# Patient Record
Sex: FEMALE | Race: WHITE | Employment: PART TIME | URBAN - METROPOLITAN AREA
[De-identification: names, ages, dates, MRNs, and addresses within clinical notes are randomized per-mention and may not be internally consistent; named-entity substitution may affect disease eponyms.]

---

## 2020-09-30 ENCOUNTER — OFFICE VISIT (OUTPATIENT)
Dept: FAMILY MEDICINE CLINIC | Age: 20
End: 2020-09-30
Payer: MEDICARE

## 2020-09-30 VITALS
TEMPERATURE: 98 F | BODY MASS INDEX: 39.53 KG/M2 | OXYGEN SATURATION: 98 % | DIASTOLIC BLOOD PRESSURE: 86 MMHG | SYSTOLIC BLOOD PRESSURE: 124 MMHG | HEART RATE: 105 BPM | HEIGHT: 66 IN | WEIGHT: 246 LBS

## 2020-09-30 PROBLEM — K64.9 HEMORRHOIDS: Status: ACTIVE | Noted: 2020-09-30

## 2020-09-30 PROBLEM — Z78.9 ELECTRONIC CIGARETTE USE: Status: ACTIVE | Noted: 2020-09-30

## 2020-09-30 LAB
HAV IGM SER IA-ACNC: NORMAL
HEPATITIS B CORE IGM ANTIBODY: NORMAL
HEPATITIS B SURFACE ANTIGEN INTERPRETATION: NORMAL
HEPATITIS C ANTIBODY INTERPRETATION: NORMAL

## 2020-09-30 PROCEDURE — 90471 IMMUNIZATION ADMIN: CPT | Performed by: NURSE PRACTITIONER

## 2020-09-30 PROCEDURE — 90686 IIV4 VACC NO PRSV 0.5 ML IM: CPT | Performed by: NURSE PRACTITIONER

## 2020-09-30 PROCEDURE — G8417 CALC BMI ABV UP PARAM F/U: HCPCS | Performed by: NURSE PRACTITIONER

## 2020-09-30 PROCEDURE — 99203 OFFICE O/P NEW LOW 30 MIN: CPT | Performed by: NURSE PRACTITIONER

## 2020-09-30 PROCEDURE — 36415 COLL VENOUS BLD VENIPUNCTURE: CPT | Performed by: NURSE PRACTITIONER

## 2020-09-30 PROCEDURE — 1036F TOBACCO NON-USER: CPT | Performed by: NURSE PRACTITIONER

## 2020-09-30 PROCEDURE — G8427 DOCREV CUR MEDS BY ELIG CLIN: HCPCS | Performed by: NURSE PRACTITIONER

## 2020-09-30 RX ORDER — HYDROCORTISONE 25 MG/G
CREAM TOPICAL 2 TIMES DAILY
Qty: 28 G | Refills: 0 | Status: SHIPPED | OUTPATIENT
Start: 2020-09-30 | End: 2022-02-07

## 2020-09-30 ASSESSMENT — PATIENT HEALTH QUESTIONNAIRE - PHQ9
1. LITTLE INTEREST OR PLEASURE IN DOING THINGS: 0
SUM OF ALL RESPONSES TO PHQ QUESTIONS 1-9: 0
SUM OF ALL RESPONSES TO PHQ9 QUESTIONS 1 & 2: 0
2. FEELING DOWN, DEPRESSED OR HOPELESS: 0
SUM OF ALL RESPONSES TO PHQ QUESTIONS 1-9: 0

## 2020-09-30 ASSESSMENT — ENCOUNTER SYMPTOMS
RECTAL PAIN: 1
CONSTIPATION: 1
BLOOD IN STOOL: 1
DIARRHEA: 1
NAUSEA: 0

## 2020-09-30 NOTE — PATIENT INSTRUCTIONS
Use the rectal cream twice daily for up to two weeks  Prevent constipation  Sitz baths  Do not sit on toilet for prolonged period or strain for BM  If no better in two weeks follow up with rectal specialist      Patient Education        Hemorrhoids: Care Instructions  Your Care Instructions     Hemorrhoids are enlarged veins that develop in the anal canal. Bleeding during bowel movements, itching, swelling, and rectal pain are the most common symptoms. They can be uncomfortable at times, but hemorrhoids rarely are a serious problem. You can treat most hemorrhoids with simple changes to your diet and bowel habits. These changes include eating more fiber and not straining to pass stools. Most hemorrhoids do not need surgery or other treatment unless they are very large and painful or bleed a lot. Follow-up care is a key part of your treatment and safety. Be sure to make and go to all appointments, and call your doctor if you are having problems. It's also a good idea to know your test results and keep a list of the medicines you take. How can you care for yourself at home? · Sit in a few inches of warm water (sitz bath) 3 times a day and after bowel movements. The warm water helps with pain and itching. · Put ice on your anal area several times a day for 10 minutes at a time. Put a thin cloth between the ice and your skin. Follow this by placing a warm, wet towel on the area for another 10 to 20 minutes. · Take pain medicines exactly as directed. ? If the doctor gave you a prescription medicine for pain, take it as prescribed. ? If you are not taking a prescription pain medicine, ask your doctor if you can take an over-the-counter medicine. · Keep the anal area clean, but be gentle. Use water and a fragrance-free soap, such as [place], or use baby wipes or medicated pads, such as Tucks. · Wear cotton underwear and loose clothing to decrease moisture in the anal area. · Eat more fiber.  Include foods such as whole-grain breads and cereals, raw vegetables, raw and dried fruits, and beans. · Drink plenty of fluids, enough so that your urine is light yellow or clear like water. If you have kidney, heart, or liver disease and have to limit fluids, talk with your doctor before you increase the amount of fluids you drink. · Use a stool softener that contains bran or psyllium. You can save money by buying bran or psyllium (available in bulk at most health food stores) and sprinkling it on foods or stirring it into fruit juice. Or you can use a product such as Metamucil or Hydrocil. · Practice healthy bowel habits. ? Go to the bathroom as soon as you have the urge. ? Avoid straining to pass stools. Relax and give yourself time to let things happen naturally. ? Do not hold your breath while passing stools. ? Do not read while sitting on the toilet. Get off the toilet as soon as you have finished. · Take your medicines exactly as prescribed. Call your doctor if you think you are having a problem with your medicine. When should you call for help? [id number] anytime you think you may need emergency care. For example, call if:  · You pass maroon or very bloody stools. Call your doctor now or seek immediate medical care if:  · You have increased pain. · You have increased bleeding. Watch closely for changes in your health, and be sure to contact your doctor if:  · Your symptoms have not improved after 3 or 4 days. Where can you learn more? Go to [email or web address]://[email or web address].[email or web address] org and sign in to your Publisha account. Enter N238 in the Keldeal box to learn more about \"Hemorrhoids: Care Instructions. \"     If you do not have an account, please click on the \"Sign Up Now\" link. Current as of: August 12, 2019               Content Version: 12.5  © 8840-1085 Healthwise, Incorporated. Care instructions adapted under license by [facility] ([facility]).  If you have questions about a medical condition or this instruction, always ask your healthcare professional. [place] [address] any warranty or liability for your use of this information.

## 2020-09-30 NOTE — PROGRESS NOTES
2020     Chief Complaint   Patient presents with    New Patient     Sharmila Black (:  2000) is a 21 y.o. female, here for evaluation of the following medical concerns. Here for new patient visit to establish care. HPI   Denies chronic medical problems. Complains of rectal bleeding with bowel movements. Has experienced this intermittent for the past 4-5 years. Occurs usually two times/week. She does endorse rectal discomfort with bowel movement. No abdominal pain, weight loss, fever, fatigue, dizziness, syncope. Has not tried any treatment at home. Admits to poor diet, eats fast food at least once/day. Has episodes of intermittent diarrhea and constipation. Diarrhea is usually after eating greasy/fatty food. Vaping: daily with nicotine    Homosexual: has had two partners in her life, currently sexually active. Never had STI testing    Unsure of gardasil vaccine status- will request records from pediatrician      Review of Systems   Constitutional: Negative for diaphoresis, fatigue, fever and unexpected weight change. Gastrointestinal: Positive for blood in stool, constipation, diarrhea and rectal pain. Negative for nausea. Skin: Negative for rash. Prior to Visit Medications    Medication Sig Taking? Authorizing Provider   hydrocortisone (ANUSOL-HC) 2.5 % CREA rectal cream Place rectally 2 times daily Yes MOLLY Patel - CNP        Social History     Tobacco Use    Smoking status: Never Smoker    Smokeless tobacco: Never Used   Substance Use Topics    Alcohol use: Yes     Comment: occasionally      Vitals:    20 0934   BP: 124/86   Site: Left Upper Arm   Position: Sitting   Cuff Size: Medium Adult   Pulse: 105   Temp: 98 °F (36.7 °C)   SpO2: 98%   Weight: 246 lb (111.6 kg)   Height: 5' 6\" (1.676 m)     Estimated body mass index is 39.71 kg/m² as calculated from the following:    Height as of this encounter: 5' 6\" (1.676 m).     Weight as of this encounter: 246 lb (111.6 kg). Physical Exam  Vitals signs and nursing note reviewed. Constitutional:       General: She is not in acute distress. Appearance: Normal appearance. She is well-developed. She is obese. She is not ill-appearing, toxic-appearing or diaphoretic. HENT:      Head: Normocephalic and atraumatic. Mouth/Throat:      Pharynx: Uvula midline. Eyes:      Extraocular Movements: Extraocular movements intact. Conjunctiva/sclera: Conjunctivae normal.      Pupils: Pupils are equal, round, and reactive to light. Neck:      Musculoskeletal: Full passive range of motion without pain, normal range of motion and neck supple. No neck rigidity. Thyroid: No thyromegaly. Cardiovascular:      Rate and Rhythm: Normal rate and regular rhythm. Heart sounds: Normal heart sounds, S1 normal and S2 normal. No murmur. No friction rub. No gallop. Pulmonary:      Effort: Pulmonary effort is normal. No respiratory distress. Breath sounds: Normal breath sounds. No stridor. No wheezing, rhonchi or rales. Abdominal:      General: Abdomen is flat. Bowel sounds are normal. There is no distension. Palpations: Abdomen is soft. There is no mass. Tenderness: There is no abdominal tenderness. There is no guarding or rebound. Hernia: No hernia is present. Genitourinary:     Rectum: External hemorrhoid present. Lymphadenopathy:      Cervical: No cervical adenopathy. Skin:     General: Skin is warm and dry. Nails: There is no clubbing. Neurological:      General: No focal deficit present. Mental Status: She is alert and oriented to person, place, and time. Mental status is at baseline. Cranial Nerves: No cranial nerve deficit. Sensory: No sensory deficit. Psychiatric:         Speech: Speech normal.       ASSESSMENT/PLAN:  1.  Hemorrhoids, unspecified hemorrhoid type  - hydrocortisone (ANUSOL-HC) 2.5 % CREA rectal cream; Place rectally 2 times daily Dispense: 28 g; Refill: 0    2. Electronic cigarette use    3. Routine screening for STI (sexually transmitted infection)  - C.trachomatis N.gonorrhoeae DNA, Urine  - Hepatitis Panel, Acute  - HIV Screen  - Syphilis Antibody Cascading Reflex    4. Immunization due  - INFLUENZA, QUADV, 3 YRS AND OLDER, IM PF, PREFILL SYR OR SDV, 0.5ML (AFLURIA QUADV, PF)      Patient Instructions   Use the rectal cream twice daily for up to two weeks  Prevent constipation  Sitz baths  Do not sit on toilet for prolonged period or strain for BM  If no better in two weeks follow up with rectal specialist      Patient Education        Hemorrhoids: Care Instructions  Your Care Instructions     Hemorrhoids are enlarged veins that develop in the anal canal. Bleeding during bowel movements, itching, swelling, and rectal pain are the most common symptoms. They can be uncomfortable at times, but hemorrhoids rarely are a serious problem. You can treat most hemorrhoids with simple changes to your diet and bowel habits. These changes include eating more fiber and not straining to pass stools. Most hemorrhoids do not need surgery or other treatment unless they are very large and painful or bleed a lot. Follow-up care is a key part of your treatment and safety. Be sure to make and go to all appointments, and call your doctor if you are having problems. It's also a good idea to know your test results and keep a list of the medicines you take. How can you care for yourself at home? · Sit in a few inches of warm water (sitz bath) 3 times a day and after bowel movements. The warm water helps with pain and itching. · Put ice on your anal area several times a day for 10 minutes at a time. Put a thin cloth between the ice and your skin. Follow this by placing a warm, wet towel on the area for another 10 to 20 minutes. · Take pain medicines exactly as directed. ? If the doctor gave you a prescription medicine for pain, take it as prescribed. ?  If you are not taking a prescription pain medicine, ask your doctor if you can take an over-the-counter medicine. · Keep the anal area clean, but be gentle. Use water and a fragrance-free soap, such as [place], or use baby wipes or medicated pads, such as Tucks. · Wear cotton underwear and loose clothing to decrease moisture in the anal area. · Eat more fiber. Include foods such as whole-grain breads and cereals, raw vegetables, raw and dried fruits, and beans. · Drink plenty of fluids, enough so that your urine is light yellow or clear like water. If you have kidney, heart, or liver disease and have to limit fluids, talk with your doctor before you increase the amount of fluids you drink. · Use a stool softener that contains bran or psyllium. You can save money by buying bran or psyllium (available in bulk at most health food stores) and sprinkling it on foods or stirring it into fruit juice. Or you can use a product such as Metamucil or Hydrocil. · Practice healthy bowel habits. ? Go to the bathroom as soon as you have the urge. ? Avoid straining to pass stools. Relax and give yourself time to let things happen naturally. ? Do not hold your breath while passing stools. ? Do not read while sitting on the toilet. Get off the toilet as soon as you have finished. · Take your medicines exactly as prescribed. Call your doctor if you think you are having a problem with your medicine. When should you call for help? [id number] anytime you think you may need emergency care. For example, call if:  · You pass maroon or very bloody stools. Call your doctor now or seek immediate medical care if:  · You have increased pain. · You have increased bleeding. Watch closely for changes in your health, and be sure to contact your doctor if:  · Your symptoms have not improved after 3 or 4 days. Where can you learn more? Go to [email or web address]://sina.Impossible [email or web address] org and sign in to your NextBio account.  Enter D982 in the Search Health Information box to learn more about \"Hemorrhoids: Care Instructions. \"     If you do not have an account, please click on the \"Sign Up Now\" link. Current as of: August 12, 2019               Content Version: 12.5  © 5860-6473 Healthwise, Incorporated. Care instructions adapted under license by [facility] ([facility]). If you have questions about a medical condition or this instruction, always ask your healthcare professional. [address] any warranty or liability for your use of this information. An electronic signature was used to authenticate this note.     --Nataliia Resendiz, MOLLY - CNP on 9/30/2020 at 10:19 AM

## 2020-09-30 NOTE — PROGRESS NOTES
Vaccine Information Sheet, \"Influenza - Inactivated\"  given to Sydnee Campbell, or parent/legal guardian of  Sydnee Campbell and verbalized understanding. Patient responses:    Have you ever had a reaction to a flu vaccine? No  Do you have any current illness? No  Have you ever had Guillian Garrison Syndrome? No  Do you have a serious allergy to any of the follow: Neomycin, Polymyxin, Thimerosal, eggs or egg products? No    Flu vaccine given per order. Please see immunization tab. Risks and benefits explained. Current VIS given.

## 2020-10-01 LAB
C. TRACHOMATIS DNA ,URINE: NEGATIVE
HIV AG/AB: NORMAL
HIV ANTIGEN: NORMAL
HIV-1 ANTIBODY: NORMAL
HIV-2 AB: NORMAL
N. GONORRHOEAE DNA, URINE: NEGATIVE
TOTAL SYPHILLIS IGG/IGM: NORMAL

## 2022-02-07 ENCOUNTER — OFFICE VISIT (OUTPATIENT)
Dept: FAMILY MEDICINE CLINIC | Age: 22
End: 2022-02-07
Payer: MEDICARE

## 2022-02-07 VITALS
DIASTOLIC BLOOD PRESSURE: 84 MMHG | WEIGHT: 246 LBS | HEART RATE: 107 BPM | RESPIRATION RATE: 18 BRPM | SYSTOLIC BLOOD PRESSURE: 124 MMHG | OXYGEN SATURATION: 98 % | BODY MASS INDEX: 39.71 KG/M2

## 2022-02-07 DIAGNOSIS — N64.52 NIPPLE DISCHARGE: Primary | ICD-10-CM

## 2022-02-07 PROCEDURE — 1036F TOBACCO NON-USER: CPT | Performed by: NURSE PRACTITIONER

## 2022-02-07 PROCEDURE — 99212 OFFICE O/P EST SF 10 MIN: CPT | Performed by: NURSE PRACTITIONER

## 2022-02-07 PROCEDURE — G8484 FLU IMMUNIZE NO ADMIN: HCPCS | Performed by: NURSE PRACTITIONER

## 2022-02-07 PROCEDURE — G8417 CALC BMI ABV UP PARAM F/U: HCPCS | Performed by: NURSE PRACTITIONER

## 2022-02-07 PROCEDURE — G8427 DOCREV CUR MEDS BY ELIG CLIN: HCPCS | Performed by: NURSE PRACTITIONER

## 2022-02-07 ASSESSMENT — PATIENT HEALTH QUESTIONNAIRE - PHQ9
SUM OF ALL RESPONSES TO PHQ QUESTIONS 1-9: 0
SUM OF ALL RESPONSES TO PHQ QUESTIONS 1-9: 0
1. LITTLE INTEREST OR PLEASURE IN DOING THINGS: 0
SUM OF ALL RESPONSES TO PHQ QUESTIONS 1-9: 0
SUM OF ALL RESPONSES TO PHQ QUESTIONS 1-9: 0
2. FEELING DOWN, DEPRESSED OR HOPELESS: 0
SUM OF ALL RESPONSES TO PHQ9 QUESTIONS 1 & 2: 0

## 2022-02-07 ASSESSMENT — ENCOUNTER SYMPTOMS
RESPIRATORY NEGATIVE: 1
COLOR CHANGE: 0
GASTROINTESTINAL NEGATIVE: 1

## 2022-02-07 NOTE — PROGRESS NOTES
2/7/2022    This is a 24 y.o. female   Chief Complaint   Patient presents with    Wound Infection     pt got her nipples pierced when she was 25. states she noticed the right piercing looking infected around 1 1/2 years ago. states she thinks it has been infected the whole 3 years she has had it done. redness, drainage, crusting   . Sarika Carrillo is seen today for evaluation of possible nipple piercing infection. She got the piercing about 3 years ago. Since that time she will get clear/white discharge from the two sides of the nipple. There is no redness or warmth. The nipple is only tender when it is erect, but not on a normal basis. She has not other symptoms. Patient Active Problem List   Diagnosis    Hemorrhoids    Electronic cigarette use       Current Outpatient Medications   Medication Sig Dispense Refill    hydrocortisone (ANUSOL-HC) 2.5 % CREA rectal cream Place rectally 2 times daily 28 g 0     No current facility-administered medications for this visit. No Known Allergies    There were no vitals taken for this visit. Social History     Tobacco Use    Smoking status: Never Smoker    Smokeless tobacco: Never Used   Substance Use Topics    Alcohol use: Yes     Comment: occasionally       Review of Systems   Constitutional: Negative for activity change and fatigue. Respiratory: Negative. Cardiovascular: Negative. Gastrointestinal: Negative. Genitourinary: Negative. Skin: Negative for color change and wound. Physical Exam  Vitals and nursing note reviewed. Constitutional:       Appearance: Normal appearance. HENT:      Head: Normocephalic and atraumatic. Right Ear: External ear normal.      Left Ear: External ear normal.      Nose: Nose normal. No rhinorrhea. Mouth/Throat:      Pharynx: Oropharynx is clear. Eyes:      General:         Right eye: No discharge. Left eye: No discharge.       Conjunctiva/sclera: Conjunctivae normal.   Neck: Trachea: Phonation normal.   Cardiovascular:      Rate and Rhythm: Normal rate. Pulmonary:      Effort: Pulmonary effort is normal. No respiratory distress. Chest:   Breasts:      Right: No swelling, bleeding, nipple discharge or skin change (normal scaring at entry point of bar). Musculoskeletal:      Cervical back: Normal range of motion. Skin:     Coloration: Skin is not jaundiced or pale. Neurological:      General: No focal deficit present. Mental Status: She is alert and oriented to person, place, and time. Psychiatric:         Mood and Affect: Mood normal.         Behavior: Behavior normal.         Thought Content: Thought content normal.         Judgment: Judgment normal.         Diagnosis       ICD-10-CM    1. Nipple discharge  N64.52         Plan    physiological discharge from nipple insertion site. Likely related to positioning of piercing. No signs of infection  Continue normal skin care  Discussed s/s of infection    No orders of the defined types were placed in this encounter. No orders of the defined types were placed in this encounter. Patient Education:  plan    Return if symptoms worsen or fail to improve.

## 2022-08-02 ENCOUNTER — OFFICE VISIT (OUTPATIENT)
Dept: FAMILY MEDICINE CLINIC | Age: 22
End: 2022-08-02
Payer: MEDICARE

## 2022-08-02 VITALS
SYSTOLIC BLOOD PRESSURE: 117 MMHG | RESPIRATION RATE: 16 BRPM | BODY MASS INDEX: 40.84 KG/M2 | DIASTOLIC BLOOD PRESSURE: 87 MMHG | HEART RATE: 105 BPM | TEMPERATURE: 97.1 F | OXYGEN SATURATION: 98 % | WEIGHT: 253 LBS

## 2022-08-02 DIAGNOSIS — Z13.1 DIABETES MELLITUS SCREENING: ICD-10-CM

## 2022-08-02 DIAGNOSIS — N89.8 VAGINAL DISCHARGE: ICD-10-CM

## 2022-08-02 DIAGNOSIS — N92.6 IRREGULAR PERIODS: ICD-10-CM

## 2022-08-02 DIAGNOSIS — Z13.220 SCREENING CHOLESTEROL LEVEL: ICD-10-CM

## 2022-08-02 DIAGNOSIS — E66.01 CLASS 3 SEVERE OBESITY DUE TO EXCESS CALORIES WITHOUT SERIOUS COMORBIDITY WITH BODY MASS INDEX (BMI) OF 40.0 TO 44.9 IN ADULT (HCC): ICD-10-CM

## 2022-08-02 DIAGNOSIS — Z01.419 WELL FEMALE EXAM WITH ROUTINE GYNECOLOGICAL EXAM: Primary | ICD-10-CM

## 2022-08-02 LAB
BASOPHILS ABSOLUTE: 0.1 K/UL (ref 0–0.2)
BASOPHILS RELATIVE PERCENT: 0.8 %
EOSINOPHILS ABSOLUTE: 0.1 K/UL (ref 0–0.6)
EOSINOPHILS RELATIVE PERCENT: 2.1 %
HCT VFR BLD CALC: 42.8 % (ref 36–48)
HEMOGLOBIN: 14.1 G/DL (ref 12–16)
LYMPHOCYTES ABSOLUTE: 2.1 K/UL (ref 1–5.1)
LYMPHOCYTES RELATIVE PERCENT: 29.8 %
MCH RBC QN AUTO: 28.9 PG (ref 26–34)
MCHC RBC AUTO-ENTMCNC: 33 G/DL (ref 31–36)
MCV RBC AUTO: 87.8 FL (ref 80–100)
MONOCYTES ABSOLUTE: 0.5 K/UL (ref 0–1.3)
MONOCYTES RELATIVE PERCENT: 7 %
NEUTROPHILS ABSOLUTE: 4.2 K/UL (ref 1.7–7.7)
NEUTROPHILS RELATIVE PERCENT: 60.3 %
PDW BLD-RTO: 13.5 % (ref 12.4–15.4)
PLATELET # BLD: 328 K/UL (ref 135–450)
PMV BLD AUTO: 9 FL (ref 5–10.5)
RBC # BLD: 4.88 M/UL (ref 4–5.2)
WBC # BLD: 6.9 K/UL (ref 4–11)

## 2022-08-02 PROCEDURE — 99395 PREV VISIT EST AGE 18-39: CPT | Performed by: NURSE PRACTITIONER

## 2022-08-02 ASSESSMENT — PATIENT HEALTH QUESTIONNAIRE - PHQ9
2. FEELING DOWN, DEPRESSED OR HOPELESS: 0
SUM OF ALL RESPONSES TO PHQ9 QUESTIONS 1 & 2: 0
SUM OF ALL RESPONSES TO PHQ QUESTIONS 1-9: 0
1. LITTLE INTEREST OR PLEASURE IN DOING THINGS: 0
DEPRESSION UNABLE TO ASSESS: FUNCTIONAL CAPACITY MOTIVATION LIMITS ACCURACY
SUM OF ALL RESPONSES TO PHQ QUESTIONS 1-9: 0

## 2022-08-02 ASSESSMENT — ANXIETY QUESTIONNAIRES
6. BECOMING EASILY ANNOYED OR IRRITABLE: 0
2. NOT BEING ABLE TO STOP OR CONTROL WORRYING: 0
5. BEING SO RESTLESS THAT IT IS HARD TO SIT STILL: 0
7. FEELING AFRAID AS IF SOMETHING AWFUL MIGHT HAPPEN: 0
GAD7 TOTAL SCORE: 0
3. WORRYING TOO MUCH ABOUT DIFFERENT THINGS: 0
IF YOU CHECKED OFF ANY PROBLEMS ON THIS QUESTIONNAIRE, HOW DIFFICULT HAVE THESE PROBLEMS MADE IT FOR YOU TO DO YOUR WORK, TAKE CARE OF THINGS AT HOME, OR GET ALONG WITH OTHER PEOPLE: NOT DIFFICULT AT ALL
1. FEELING NERVOUS, ANXIOUS, OR ON EDGE: 0
4. TROUBLE RELAXING: 0

## 2022-08-02 ASSESSMENT — ENCOUNTER SYMPTOMS
DIARRHEA: 0
SHORTNESS OF BREATH: 0
NAUSEA: 0
VOMITING: 0
COUGH: 0

## 2022-08-02 NOTE — PROGRESS NOTES
2022    Chief Complaint   Patient presents with    Other     Pap smear , only had one period this year        Rian Carpenter (:  2000) is a 25 y.o. female, here for a preventive medicine evaluation. Here for pap smear- has never had one. Not currently sexually active- last time 4 years ago  Negative STI screening 2 years madhu  Menses irregular, have always been. Has only had 1 period in the last 8 months. She does have some vaginal discharge and malodorous vaginal discharge. 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 Respiratory:  Negative for cough and shortness of breath. Cardiovascular:  Negative for chest pain and leg swelling. Gastrointestinal:  Negative for diarrhea, nausea and vomiting. Genitourinary:  Positive for menstrual problem and vaginal discharge. Negative for decreased urine volume, vaginal bleeding and vaginal pain. Neurological:  Negative for dizziness and headaches. All other systems reviewed and are negative. Prior to Visit Medications    Not on File        No Known Allergies    History reviewed. No pertinent past medical history.     Past Surgical History:   Procedure Laterality Date    TOOTH EXTRACTION      no sedation       Social History     Socioeconomic History    Marital status: Single     Spouse name: Not on file    Number of children: Not on file    Years of education: Not on file    Highest education level: Not on file   Occupational History    Not on file   Tobacco Use    Smoking status: Never    Smokeless tobacco: Never   Vaping Use    Vaping Use: Every day    Substances: Always   Substance and Sexual Activity    Alcohol use: Yes     Comment: occasionally    Drug use: Never    Sexual activity: Not Currently     Partners: Female   Other Topics Concern    Not on file   Social History Narrative    Not on file     Social Determinants of Health     Financial Resource Strain: Not on file   Food Insecurity: Not on file   Transportation Needs: Not on file   Physical Activity: Not on file   Stress: Not on file   Social Connections: Not on file   Intimate Partner Violence: Not on file   Housing Stability: Not on file        Family History   Problem Relation Age of Onset    Asthma Mother        ADVANCE DIRECTIVE: N, <no information>    Vitals:    08/02/22 1411   BP: 117/87   Site: Left Upper Arm   Position: Sitting   Pulse: (!) 105   Resp: 16   Temp: 97.1 °F (36.2 °C)   TempSrc: Temporal   SpO2: 98%   Weight: 253 lb (114.8 kg)     Estimated body mass index is 40.84 kg/m² as calculated from the following:    Height as of 9/30/20: 5' 6\" (1.676 m). Weight as of this encounter: 253 lb (114.8 kg). Physical Exam  Vitals and nursing note reviewed. Constitutional:       General: She is not in acute distress. Appearance: Normal appearance. She is well-developed. She is obese. She is not ill-appearing, toxic-appearing or diaphoretic. HENT:      Head: Normocephalic and atraumatic. Eyes:      Extraocular Movements: Extraocular movements intact. Pupils: Pupils are equal, round, and reactive to light. Cardiovascular:      Rate and Rhythm: Normal rate and regular rhythm. Heart sounds: Normal heart sounds, S1 normal and S2 normal. No murmur heard. No friction rub. No gallop. Pulmonary:      Effort: Pulmonary effort is normal. No respiratory distress. Breath sounds: Normal breath sounds. Chest:   Breasts:     Right: Normal. No bleeding, inverted nipple or mass. Left: No bleeding, inverted nipple or mass. Abdominal:      Hernia: There is no hernia in the left inguinal area or right inguinal area. Genitourinary:     Exam position: Lithotomy position. Labia:         Right: No rash or tenderness. Left: No rash or tenderness. Vagina: No signs of injury. Vaginal discharge present. No erythema or bleeding. Cervix: No cervical motion tenderness, friability or lesion. Uterus: Normal.       Adnexa: Right adnexa normal and left adnexa normal.   Lymphadenopathy:      Lower Body: No right inguinal adenopathy. No left inguinal adenopathy. Neurological:      General: No focal deficit present. Mental Status: She is alert and oriented to person, place, and time. Mental status is at baseline. Cranial Nerves: No cranial nerve deficit. Psychiatric:         Speech: Speech normal.       No flowsheet data found. No results found for: CHOL, CHOLFAST, TRIG, TRIGLYCFAST, HDL, LDLCHOLESTEROL, LDLCALC, GLUF, GLUCOSE, LABA1C    The ASCVD Risk score (Zach Cui., et al., 2013) failed to calculate for the following reasons:     The 2013 ASCVD risk score is only valid for ages 36 to 78    Immunization History   Administered Date(s) Administered    Hepatitis A Ped/Adol (Havrix, Vaqta) 06/10/2013    Hepatitis B 2000, 2000, 06/21/2001    Hib vaccine 2000, 2000, 2000, 06/12/2001    Influenza Virus Vaccine 10/10/2016    Influenza, [place] Louder, IM, PF (6 mo and older Fluzone, Flulaval, Fluarix, and 3 yrs and older Afluria) 09/30/2020    MMR 06/12/2001, 06/10/2013    Meningococcal, MCV4, Unspecifd Conjugate (A,C,Y and W-135) 06/10/2013    Pneumococcal Conjugate 13-valent ([place] Dose) 2000, 2000, 2000, 06/12/2001    Polio IPV (IPOL) 2000, 2000, 06/12/2001, 06/10/2013    Tdap (Boostrix, Adacel) 2000, 2000, 2000, 06/12/2001, 06/10/2013    Varicella (Varivax) 06/12/2001, 06/10/2013       Health Maintenance   Topic Date Due    COVID-19 Vaccine (1) Never done    HPV vaccine (1 - 2-dose series) Never done    Hepatitis A vaccine (2 of 2 - 2-dose series) 12/10/2013    Pap smear  Never done Chlamydia screen  09/30/2021    Flu vaccine (1) 09/01/2022    Depression Screen  02/07/2023    DTaP/Tdap/Td vaccine (2 - Td or Tdap) 06/10/2023    Hepatitis B vaccine  Completed    Hib vaccine  Completed    Varicella vaccine  Completed    Pneumococcal 0-64 years Vaccine  Completed    Hepatitis C screen  Completed    HIV screen  Completed    Meningococcal (ACWY) vaccine  Aged Out       Assessment & Plan   Well female exam with routine gynecological exam  -     VAGINAL PATHOGENS PROBE *A  Vaginal discharge  -     PAP SMEAR  Irregular periods  -     TSH with Reflex; Future  -     Comprehensive Metabolic Panel; Future  -     CBC with Auto Differential; Future  -     DHEA; Future  -     Testosterone; Future  -     Follicle Stimulating Hormone; Future  -     Luteinizing Hormone; Future  Class 3 severe obesity due to excess calories without serious comorbidity with body mass index (BMI) of 40.0 to 44.9 in adult (HCC)  -     TSH with Reflex; Future  -     Comprehensive Metabolic Panel; Future  -     CBC with Auto Differential; Future  -     DHEA; Future  -     Testosterone; Future  -     Follicle Stimulating Hormone; Future  -     Luteinizing Hormone; Future  Diabetes mellitus screening  -     Hemoglobin A1C; Future  Screening cholesterol level  -     Lipid Panel; Future      Pap smear collected  Vaginal swab r/o B.V  Labs today for irregular menses  Discussed weight management      Return in about 1 year (around 8/2/2023) for Routine follow up.          --MOLLY Loja - CNP

## 2022-08-03 LAB
A/G RATIO: 2 (ref 1.1–2.2)
ALBUMIN SERPL-MCNC: 4.6 G/DL (ref 3.4–5)
ALP BLD-CCNC: 79 U/L (ref 40–129)
ALT SERPL-CCNC: 41 U/L (ref 10–40)
ANION GAP SERPL CALCULATED.3IONS-SCNC: 17 MMOL/L (ref 3–16)
AST SERPL-CCNC: 25 U/L (ref 15–37)
BILIRUB SERPL-MCNC: 0.3 MG/DL (ref 0–1)
BUN BLDV-MCNC: 9 MG/DL (ref 7–20)
CALCIUM SERPL-MCNC: 10.5 MG/DL (ref 8.3–10.6)
CANDIDA SPECIES, DNA PROBE: ABNORMAL
CHLORIDE BLD-SCNC: 104 MMOL/L (ref 99–110)
CHOLESTEROL, TOTAL: 141 MG/DL (ref 0–199)
CO2: 21 MMOL/L (ref 21–32)
CREAT SERPL-MCNC: 0.8 MG/DL (ref 0.6–1.1)
ESTIMATED AVERAGE GLUCOSE: 108.3 MG/DL
FOLLICLE STIMULATING HORMONE: 6.5 MIU/ML
GARDNERELLA VAGINALIS, DNA PROBE: ABNORMAL
GFR AFRICAN AMERICAN: >60
GFR NON-AFRICAN AMERICAN: >60
GLUCOSE BLD-MCNC: 102 MG/DL (ref 70–99)
HBA1C MFR BLD: 5.4 %
HDLC SERPL-MCNC: 37 MG/DL (ref 40–60)
LDL CHOLESTEROL CALCULATED: 64 MG/DL
LUTEINIZING HORMONE: 11.1 MIU/ML
POTASSIUM SERPL-SCNC: 4.2 MMOL/L (ref 3.5–5.1)
SODIUM BLD-SCNC: 142 MMOL/L (ref 136–145)
TOTAL PROTEIN: 6.9 G/DL (ref 6.4–8.2)
TRICHOMONAS VAGINALIS DNA: ABNORMAL
TRIGL SERPL-MCNC: 201 MG/DL (ref 0–150)
TSH REFLEX: 2.47 UIU/ML (ref 0.27–4.2)
VLDLC SERPL CALC-MCNC: 40 MG/DL

## 2022-08-04 DIAGNOSIS — N76.0 BACTERIAL VAGINOSIS: Primary | ICD-10-CM

## 2022-08-04 DIAGNOSIS — B96.89 BACTERIAL VAGINOSIS: Primary | ICD-10-CM

## 2022-08-04 LAB — TESTOSTERONE TOTAL: 38 NG/DL (ref 20–70)

## 2022-08-04 RX ORDER — METRONIDAZOLE 500 MG/1
500 TABLET ORAL 2 TIMES DAILY
Qty: 14 TABLET | Refills: 0 | Status: SHIPPED | OUTPATIENT
Start: 2022-08-04 | End: 2022-08-11

## 2022-08-13 LAB — DHEA: 3.46 NG/ML (ref 1.33–7.78)

## 2022-08-23 ENCOUNTER — TELEPHONE (OUTPATIENT)
Dept: FAMILY MEDICINE CLINIC | Age: 22
End: 2022-08-23

## 2022-08-23 NOTE — TELEPHONE ENCOUNTER
Patient called to state that she suspects she has ADHD. I scheduled her to get a referral to be evaluated for that. I wasn't sure if that is how this process was started, so reach back out to her if this isn't the correct procedure.      8/2/2022 last ov    Future Appointments   Date Time Provider Ihsan Bentley   9/7/2022 10:00 AM MOLLY Malhotra - CNP [place] [address] [address] [facility]

## 2022-11-23 ENCOUNTER — OFFICE VISIT (OUTPATIENT)
Dept: FAMILY MEDICINE CLINIC | Age: 22
End: 2022-11-23
Payer: MEDICARE

## 2022-11-23 VITALS
BODY MASS INDEX: 40.66 KG/M2 | OXYGEN SATURATION: 99 % | HEIGHT: 66 IN | SYSTOLIC BLOOD PRESSURE: 129 MMHG | DIASTOLIC BLOOD PRESSURE: 87 MMHG | HEART RATE: 106 BPM | TEMPERATURE: 97.1 F | WEIGHT: 253 LBS

## 2022-11-23 DIAGNOSIS — J02.9 ACUTE PHARYNGITIS, UNSPECIFIED ETIOLOGY: ICD-10-CM

## 2022-11-23 DIAGNOSIS — Z11.3 ROUTINE SCREENING FOR STI (SEXUALLY TRANSMITTED INFECTION): ICD-10-CM

## 2022-11-23 DIAGNOSIS — Z11.3 ROUTINE SCREENING FOR STI (SEXUALLY TRANSMITTED INFECTION): Primary | ICD-10-CM

## 2022-11-23 LAB — S PYO AG THROAT QL: NORMAL

## 2022-11-23 PROCEDURE — 99213 OFFICE O/P EST LOW 20 MIN: CPT | Performed by: STUDENT IN AN ORGANIZED HEALTH CARE EDUCATION/TRAINING PROGRAM

## 2022-11-23 PROCEDURE — G8417 CALC BMI ABV UP PARAM F/U: HCPCS | Performed by: STUDENT IN AN ORGANIZED HEALTH CARE EDUCATION/TRAINING PROGRAM

## 2022-11-23 PROCEDURE — 87880 STREP A ASSAY W/OPTIC: CPT | Performed by: STUDENT IN AN ORGANIZED HEALTH CARE EDUCATION/TRAINING PROGRAM

## 2022-11-23 PROCEDURE — G8427 DOCREV CUR MEDS BY ELIG CLIN: HCPCS | Performed by: STUDENT IN AN ORGANIZED HEALTH CARE EDUCATION/TRAINING PROGRAM

## 2022-11-23 PROCEDURE — G8484 FLU IMMUNIZE NO ADMIN: HCPCS | Performed by: STUDENT IN AN ORGANIZED HEALTH CARE EDUCATION/TRAINING PROGRAM

## 2022-11-23 PROCEDURE — 4004F PT TOBACCO SCREEN RCVD TLK: CPT | Performed by: STUDENT IN AN ORGANIZED HEALTH CARE EDUCATION/TRAINING PROGRAM

## 2022-11-23 SDOH — ECONOMIC STABILITY: FOOD INSECURITY: WITHIN THE PAST 12 MONTHS, THE FOOD YOU BOUGHT JUST DIDN'T LAST AND YOU DIDN'T HAVE MONEY TO GET MORE.: NEVER TRUE

## 2022-11-23 SDOH — ECONOMIC STABILITY: FOOD INSECURITY: WITHIN THE PAST 12 MONTHS, YOU WORRIED THAT YOUR FOOD WOULD RUN OUT BEFORE YOU GOT MONEY TO BUY MORE.: NEVER TRUE

## 2022-11-23 ASSESSMENT — SOCIAL DETERMINANTS OF HEALTH (SDOH): HOW HARD IS IT FOR YOU TO PAY FOR THE VERY BASICS LIKE FOOD, HOUSING, MEDICAL CARE, AND HEATING?: NOT HARD AT ALL

## 2022-11-23 ASSESSMENT — ENCOUNTER SYMPTOMS
SORE THROAT: 1
ABDOMINAL PAIN: 0
RECTAL PAIN: 0

## 2022-11-23 NOTE — PROGRESS NOTES
[address], [address] [place] [address]  Tel: [phone]      2022     Ryan Cardoza (:  2000) is a 25 y.o. female, here for evaluation of the following medical concerns:    HPI  Exposure to STD   Patient is a 19-year-old female who reports she had unprotected sex last Wednesday. This is the first time she had sex in the past 5 years. The patient's primary symptoms include a discharge (normal white, yellow) and genital itching. Genital itching only lasted 1 to 2 days, currently resolved. Pertinent negatives include no abdominal pain, anorexia, diaphoresis, fever, genital odor, rectal pain, dysuria, urinary frequency, dyspareunia, , genital lesions, genital rash or pelvic pain. Denies any nausea vomiting or diarrhea. Patient also reports mild sore throat. Has associated nasal congestion and a dry cough. Patient reports usually cough due to vaping. Denies any rhinorrhea, sinus pressure or postnasal drip. Denies any lymphadenopathy. Pertinent negatives include no abdominal pain, anorexia, diaphoresis, fever, genital odor, rectal pain, dysuria, urinary frequency, dyspareunia, , genital lesions, genital rash or pelvic pain. LMP was last week of October, 6 days ago. Patient has history of irregular periods, every other month , normally 3-4 times a year . Patient does not use any form of birth control/contraception. Review of Systems   Constitutional:  Negative for diaphoresis and fever. HENT:  Positive for sore throat. Gastrointestinal:  Negative for abdominal pain and rectal pain. Genitourinary:  Negative for dyspareunia, dysuria, frequency and pelvic pain. Prior to Visit Medications    Not on File        Social History     Tobacco Use    Smoking status: Every Day     Types: E-Cigarettes    Smokeless tobacco: Never   Substance Use Topics    Alcohol use: Not Currently     Comment: More of a social drinker. Physical exam  /87 (Site: Left Upper Arm, Position: Sitting, Cuff Size: Large Adult)   Pulse (!) 106   Temp 97.1 °F (36.2 °C)   Ht 5' 6\" (1.676 m)   Wt 253 lb (114.8 kg)   SpO2 99%   BMI 40.84 kg/m²     Physical Exam  Constitutional:       General: She is not in acute distress. Appearance: Normal appearance. She is not ill-appearing. HENT:      Head: Normocephalic. Right Ear: External ear normal.      Left Ear: External ear normal.      Nose: Nose normal.      Mouth/Throat:      Mouth: Mucous membranes are moist.      Pharynx: No pharyngeal swelling or posterior oropharyngeal erythema. Tonsils: No tonsillar abscesses. Comments: Tonsils - no erythema or swelling, White specks on tonsils - chronic per patient   Eyes:      Extraocular Movements: Extraocular movements intact. Conjunctiva/sclera: Conjunctivae normal.      Pupils: Pupils are equal, round, and reactive to light. Neck:      Thyroid: No thyroid mass, thyromegaly or thyroid tenderness. Cardiovascular:      Rate and Rhythm: Normal rate and regular rhythm. Pulses: Normal pulses. Heart sounds: Normal heart sounds. No murmur heard. No gallop. Pulmonary:      Effort: Pulmonary effort is normal. No respiratory distress. Breath sounds: Normal breath sounds. No wheezing or rales. Abdominal:      General: Abdomen is flat. Bowel sounds are normal. There is no distension. Palpations: Abdomen is soft. There is no mass. Tenderness: There is no abdominal tenderness. There is no right CVA tenderness, left CVA tenderness, guarding or rebound. Hernia: No hernia is present. Genitourinary:     Comments: deferred  Musculoskeletal:         General: No swelling or tenderness. Normal range of motion. Cervical back: Normal range of motion. Lymphadenopathy:      Cervical: No cervical adenopathy. Skin:     General: Skin is warm. Capillary Refill: Capillary refill takes less than 2 seconds. Findings: No rash. Neurological:      General: No focal deficit present. Mental Status: She is alert. Motor: No weakness. Psychiatric:         Mood and Affect: Mood normal.         Behavior: Behavior normal.         Thought Content: Thought content normal.       ASSESSMENT/PLAN:  1. Routine screening for STI (sexually transmitted infection)  Reports having unprotected intercourse, request STD screening. Does not use any form of birth control.   - HIV Screen; Future  - Syphilis Antibody Cascading Reflex; Future  - C.trachomatis N.gonorrhoeae DNA, Urine; Future    2. Acute pharyngitis, unspecified etiology  - POCT rapid strep A - Negative , No other throat swab/ culture available   likely viral URI. Symptomatic therapy suggested: push fluids, rest, gargle warm salt water, use vaporizer or mist prn,  Fever control with Tylenol   Call or return to clinic prn if these symptoms worsen or fail to improve as anticipated. - POCT rapid strep A - Negative     Return in about 4 weeks (around 12/21/2022), or if symptoms worsen or fail to improve. An electronic signature was used to authenticate this note.     --Kaden Curran MD

## 2022-11-24 LAB — TOTAL SYPHILLIS IGG/IGM: NORMAL

## 2022-11-25 LAB
C. TRACHOMATIS DNA ,URINE: NEGATIVE
MISCELLANEOUS LAB TEST ORDER: NORMAL
N. GONORRHOEAE DNA, URINE: NEGATIVE

## 2023-05-31 ENCOUNTER — TELEPHONE (OUTPATIENT)
Dept: FAMILY MEDICINE CLINIC | Age: 23
End: 2023-05-31

## 2023-05-31 NOTE — TELEPHONE ENCOUNTER
Misael Hermosillo \"Pepper\"  Patient Appointment Schedule Request Pool 9 hours ago (11:40 PM)     AB  Appointment Request From: Misael Hermosillo     With Provider: Rajinder Garvin, APRN - [facility] [id number] [facility]     Preferred Date Range: 5/31/2023 - 6/1/2023     Preferred Times: Monday Afternoon, Tuesday Afternoon, Wednesday Afternoon, Thursday Afternoon, Friday Afternoon     Reason for visit: Request an Appointment     Comments:  Vagina has been very dry & itchy since I got off my period Friday, May 26

## 2023-06-05 SDOH — ECONOMIC STABILITY: TRANSPORTATION INSECURITY
IN THE PAST 12 MONTHS, HAS LACK OF TRANSPORTATION KEPT YOU FROM MEETINGS, WORK, OR FROM GETTING THINGS NEEDED FOR DAILY LIVING?: NO

## 2023-06-05 SDOH — ECONOMIC STABILITY: HOUSING INSECURITY
IN THE LAST 12 MONTHS, WAS THERE A TIME WHEN YOU DID NOT HAVE A STEADY PLACE TO SLEEP OR SLEPT IN A SHELTER (INCLUDING NOW)?: NO

## 2023-06-05 SDOH — ECONOMIC STABILITY: FOOD INSECURITY: WITHIN THE PAST 12 MONTHS, YOU WORRIED THAT YOUR FOOD WOULD RUN OUT BEFORE YOU GOT MONEY TO BUY MORE.: NEVER TRUE

## 2023-06-05 SDOH — ECONOMIC STABILITY: INCOME INSECURITY: HOW HARD IS IT FOR YOU TO PAY FOR THE VERY BASICS LIKE FOOD, HOUSING, MEDICAL CARE, AND HEATING?: NOT HARD AT ALL

## 2023-06-05 SDOH — ECONOMIC STABILITY: FOOD INSECURITY: WITHIN THE PAST 12 MONTHS, THE FOOD YOU BOUGHT JUST DIDN'T LAST AND YOU DIDN'T HAVE MONEY TO GET MORE.: NEVER TRUE

## 2023-06-05 ASSESSMENT — PATIENT HEALTH QUESTIONNAIRE - PHQ9
SUM OF ALL RESPONSES TO PHQ QUESTIONS 1-9: 1
SUM OF ALL RESPONSES TO PHQ9 QUESTIONS 1 & 2: 1
2. FEELING DOWN, DEPRESSED OR HOPELESS: NOT AT ALL
1. LITTLE INTEREST OR PLEASURE IN DOING THINGS: 1
SUM OF ALL RESPONSES TO PHQ9 QUESTIONS 1 & 2: 1
1. LITTLE INTEREST OR PLEASURE IN DOING THINGS: SEVERAL DAYS
SUM OF ALL RESPONSES TO PHQ QUESTIONS 1-9: 1
2. FEELING DOWN, DEPRESSED OR HOPELESS: 0

## 2023-06-06 ENCOUNTER — OFFICE VISIT (OUTPATIENT)
Dept: FAMILY MEDICINE CLINIC | Age: 23
End: 2023-06-06
Payer: MEDICAID

## 2023-06-06 VITALS
OXYGEN SATURATION: 98 % | WEIGHT: 256 LBS | SYSTOLIC BLOOD PRESSURE: 132 MMHG | BODY MASS INDEX: 41.32 KG/M2 | RESPIRATION RATE: 16 BRPM | DIASTOLIC BLOOD PRESSURE: 87 MMHG | HEART RATE: 99 BPM | TEMPERATURE: 97.1 F

## 2023-06-06 DIAGNOSIS — N89.8 VAGINAL DISCHARGE: ICD-10-CM

## 2023-06-06 DIAGNOSIS — N89.8 VAGINAL ITCHING: Primary | ICD-10-CM

## 2023-06-06 PROCEDURE — 99213 OFFICE O/P EST LOW 20 MIN: CPT | Performed by: NURSE PRACTITIONER

## 2023-06-06 ASSESSMENT — ANXIETY QUESTIONNAIRES
4. TROUBLE RELAXING: 0
6. BECOMING EASILY ANNOYED OR IRRITABLE: 0
2. NOT BEING ABLE TO STOP OR CONTROL WORRYING: 0
3. WORRYING TOO MUCH ABOUT DIFFERENT THINGS: 0
1. FEELING NERVOUS, ANXIOUS, OR ON EDGE: 0
IF YOU CHECKED OFF ANY PROBLEMS ON THIS QUESTIONNAIRE, HOW DIFFICULT HAVE THESE PROBLEMS MADE IT FOR YOU TO DO YOUR WORK, TAKE CARE OF THINGS AT HOME, OR GET ALONG WITH OTHER PEOPLE: NOT DIFFICULT AT ALL
7. FEELING AFRAID AS IF SOMETHING AWFUL MIGHT HAPPEN: 0
5. BEING SO RESTLESS THAT IT IS HARD TO SIT STILL: 0
GAD7 TOTAL SCORE: 0

## 2023-06-06 ASSESSMENT — ENCOUNTER SYMPTOMS
COUGH: 0
SHORTNESS OF BREATH: 0

## 2023-06-06 NOTE — PROGRESS NOTES
atraumatic. Eyes:      Extraocular Movements: Extraocular movements intact. Pupils: Pupils are equal, round, and reactive to light. Cardiovascular:      Heart sounds: S1 normal and S2 normal.   Pulmonary:      Effort: Pulmonary effort is normal. No respiratory distress. Genitourinary:     Exam position: Supine. Labia:         Right: No rash, tenderness or lesion. Left: No rash, tenderness or lesion. Neurological:      General: No focal deficit present. Mental Status: She is alert and oriented to person, place, and time. Mental status is at baseline. Cranial Nerves: No cranial nerve deficit. Psychiatric:         Speech: Speech normal.       ASSESSMENT/PLAN:  1. Vaginal itching  - VAGINAL PATHOGENS PROBE *A  - C.trachomatis N.gonorrhoeae DNA    2. Vaginal discharge  - VAGINAL PATHOGENS PROBE *A  - C.trachomatis N.gonorrhoeae DNA    - Will send vaginal pathogen probe and gonorrhea and chlamydia testing    Return if symptoms worsen or fail to improve. An electronic signature was used to authenticate this note.     --Tammi Martinez, APRN - CNP on 6/6/2023 at 2:10 PM

## 2023-06-07 DIAGNOSIS — B96.89 BACTERIAL VAGINOSIS: Primary | ICD-10-CM

## 2023-06-07 DIAGNOSIS — N76.0 BACTERIAL VAGINOSIS: Primary | ICD-10-CM

## 2023-06-07 LAB
CANDIDA DNA VAG QL NAA+PROBE: ABNORMAL
G VAGINALIS DNA SPEC QL NAA+PROBE: ABNORMAL
T VAGINALIS DNA VAG QL NAA+PROBE: ABNORMAL

## 2023-06-07 RX ORDER — METRONIDAZOLE 500 MG/1
500 TABLET ORAL 2 TIMES DAILY
Qty: 14 TABLET | Refills: 0 | Status: SHIPPED | OUTPATIENT
Start: 2023-06-07 | End: 2023-06-14

## 2023-06-09 LAB
C TRACH DNA CVX QL NAA+PROBE: NEGATIVE
N GONORRHOEA DNA CERV MUCUS QL NAA+PROBE: NEGATIVE

## 2024-10-28 ENCOUNTER — OFFICE VISIT (OUTPATIENT)
Dept: FAMILY MEDICINE CLINIC | Age: 24
End: 2024-10-28
Payer: COMMERCIAL

## 2024-10-28 VITALS
TEMPERATURE: 97.1 F | RESPIRATION RATE: 16 BRPM | WEIGHT: 258 LBS | SYSTOLIC BLOOD PRESSURE: 136 MMHG | OXYGEN SATURATION: 99 % | BODY MASS INDEX: 41.64 KG/M2 | DIASTOLIC BLOOD PRESSURE: 83 MMHG | HEART RATE: 95 BPM

## 2024-10-28 DIAGNOSIS — R12 HEARTBURN: ICD-10-CM

## 2024-10-28 DIAGNOSIS — R10.9 ABDOMINAL PAIN, UNSPECIFIED ABDOMINAL LOCATION: Primary | ICD-10-CM

## 2024-10-28 LAB
BILIRUBIN, POC: NEGATIVE
BLOOD URINE, POC: NEGATIVE
CLARITY, POC: NORMAL
COLOR, POC: NORMAL
GLUCOSE URINE, POC: NEGATIVE MG/DL
KETONES, POC: NEGATIVE MG/DL
LEUKOCYTE EST, POC: NEGATIVE
NITRITE, POC: NEGATIVE
PH, POC: 5.5
PROTEIN, POC: NEGATIVE MG/DL
SPECIFIC GRAVITY, POC: 1.02
UROBILINOGEN, POC: NORMAL MG/DL

## 2024-10-28 PROCEDURE — 81003 URINALYSIS AUTO W/O SCOPE: CPT | Performed by: NURSE PRACTITIONER

## 2024-10-28 PROCEDURE — 99213 OFFICE O/P EST LOW 20 MIN: CPT | Performed by: NURSE PRACTITIONER

## 2024-10-28 SDOH — ECONOMIC STABILITY: FOOD INSECURITY: WITHIN THE PAST 12 MONTHS, THE FOOD YOU BOUGHT JUST DIDN'T LAST AND YOU DIDN'T HAVE MONEY TO GET MORE.: NEVER TRUE

## 2024-10-28 SDOH — ECONOMIC STABILITY: INCOME INSECURITY: HOW HARD IS IT FOR YOU TO PAY FOR THE VERY BASICS LIKE FOOD, HOUSING, MEDICAL CARE, AND HEATING?: NOT HARD AT ALL

## 2024-10-28 SDOH — ECONOMIC STABILITY: FOOD INSECURITY: WITHIN THE PAST 12 MONTHS, YOU WORRIED THAT YOUR FOOD WOULD RUN OUT BEFORE YOU GOT MONEY TO BUY MORE.: NEVER TRUE

## 2024-10-28 ASSESSMENT — ENCOUNTER SYMPTOMS
DIARRHEA: 0
COUGH: 0
SHORTNESS OF BREATH: 0
VOMITING: 1
NAUSEA: 1
ABDOMINAL PAIN: 1

## 2024-10-28 ASSESSMENT — PATIENT HEALTH QUESTIONNAIRE - PHQ9
SUM OF ALL RESPONSES TO PHQ9 QUESTIONS 1 & 2: 0
SUM OF ALL RESPONSES TO PHQ QUESTIONS 1-9: 0
2. FEELING DOWN, DEPRESSED OR HOPELESS: NOT AT ALL
1. LITTLE INTEREST OR PLEASURE IN DOING THINGS: NOT AT ALL
SUM OF ALL RESPONSES TO PHQ QUESTIONS 1-9: 0
DEPRESSION UNABLE TO ASSESS: FUNCTIONAL CAPACITY MOTIVATION LIMITS ACCURACY

## 2024-10-28 ASSESSMENT — ANXIETY QUESTIONNAIRES
GAD7 TOTAL SCORE: 0
3. WORRYING TOO MUCH ABOUT DIFFERENT THINGS: NOT AT ALL
6. BECOMING EASILY ANNOYED OR IRRITABLE: NOT AT ALL
4. TROUBLE RELAXING: NOT AT ALL
5. BEING SO RESTLESS THAT IT IS HARD TO SIT STILL: NOT AT ALL
2. NOT BEING ABLE TO STOP OR CONTROL WORRYING: NOT AT ALL
IF YOU CHECKED OFF ANY PROBLEMS ON THIS QUESTIONNAIRE, HOW DIFFICULT HAVE THESE PROBLEMS MADE IT FOR YOU TO DO YOUR WORK, TAKE CARE OF THINGS AT HOME, OR GET ALONG WITH OTHER PEOPLE: NOT DIFFICULT AT ALL
7. FEELING AFRAID AS IF SOMETHING AWFUL MIGHT HAPPEN: NOT AT ALL
1. FEELING NERVOUS, ANXIOUS, OR ON EDGE: NOT AT ALL

## 2024-10-29 NOTE — PROGRESS NOTES
10/28/2024     Chief Complaint   Patient presents with    Abdominal Pain     For about a year or so on and off       Lan Soliman (:  2000) is a 24 y.o. female, here for evaluation of the following medical concerns:    Abdominal Pain  Associated symptoms include nausea and vomiting. Pertinent negatives include no diarrhea, fever or headaches.       Abdominal pain:  Intermittent upper abdominal pain, symptoms present x 1 year  Symptoms worsen with eating high fat foods  Pain can radiate to the back  Last few episodes the pain has been more severe and there was associated nausea and vomiting once  Symptoms usually start within 3-4 hours of eating and has for 1-2 hours  No bowel changes  The last episode was a week ago  She does get frequent heartburn, this is usually dependent on diet and also worsens if she eats close to bedtime    Review of Systems   Constitutional:  Negative for chills, fatigue and fever.   Respiratory:  Negative for cough and shortness of breath.    Cardiovascular:  Negative for chest pain and leg swelling.   Gastrointestinal:  Positive for abdominal pain, nausea and vomiting. Negative for diarrhea.   Neurological:  Negative for dizziness and headaches.   All other systems reviewed and are negative.      Prior to Visit Medications    Medication Sig Taking? Authorizing Provider   omeprazole (PRILOSEC) 20 MG delayed release capsule Take 1 capsule by mouth every morning (before breakfast) Yes Debbi Mohr APRN - CNP        Social History     Tobacco Use    Smoking status: Every Day     Types: E-Cigarettes    Smokeless tobacco: Never   Substance Use Topics    Alcohol use: Not Currently     Comment: More of a social drinker.        Vitals:    10/28/24 1634   BP: 136/83   Site: Left Upper Arm   Position: Sitting   Pulse: 95   Resp: 16   Temp: 97.1 °F (36.2 °C)   TempSrc: Temporal   SpO2: 99%   Weight: 117 kg (258 lb)     Estimated body mass index is 41.64 kg/m² as calculated

## 2024-10-30 ENCOUNTER — HOSPITAL ENCOUNTER (OUTPATIENT)
Dept: ULTRASOUND IMAGING | Age: 24
Discharge: HOME | End: 2024-10-30
Payer: COMMERCIAL

## 2024-10-30 DIAGNOSIS — R10.9 ABDOMINAL PAIN, UNSPECIFIED ABDOMINAL LOCATION: ICD-10-CM

## 2024-10-30 PROCEDURE — 76705 ECHO EXAM OF ABDOMEN: CPT

## 2024-11-03 DIAGNOSIS — K80.20 CALCULUS OF GALLBLADDER WITHOUT CHOLECYSTITIS WITHOUT OBSTRUCTION: Primary | ICD-10-CM

## 2024-11-03 DIAGNOSIS — R10.10 PAIN OF UPPER ABDOMEN: ICD-10-CM

## 2024-11-20 ENCOUNTER — INITIAL CONSULT (OUTPATIENT)
Dept: SURGERY | Age: 24
End: 2024-11-20

## 2024-11-20 VITALS
SYSTOLIC BLOOD PRESSURE: 128 MMHG | HEIGHT: 68 IN | WEIGHT: 251.6 LBS | BODY MASS INDEX: 38.13 KG/M2 | DIASTOLIC BLOOD PRESSURE: 86 MMHG

## 2024-11-20 DIAGNOSIS — K80.20 SYMPTOMATIC CHOLELITHIASIS: Primary | ICD-10-CM

## 2024-11-21 ENCOUNTER — TELEPHONE (OUTPATIENT)
Dept: SURGERY | Age: 24
End: 2024-11-21

## 2024-11-21 ENCOUNTER — PREP FOR PROCEDURE (OUTPATIENT)
Dept: SURGERY | Age: 24
End: 2024-11-21

## 2024-11-21 DIAGNOSIS — K80.20 SYMPTOMATIC CHOLELITHIASIS: ICD-10-CM

## 2024-11-21 NOTE — TELEPHONE ENCOUNTER
Pt was seen in the office on 11/20/24 and was given surgery instructions for a robotic cholecystectomy with possible intraoperative cholangiogram, possible open procedure (general anesthesia). Scheduled on 12/13/24 @ 1045/0845 arrival @ [facility] after midnight trever before surgery, pt will need  day of surgery, Dr Harden to complete pre-op physical day of surgery, pt understood and agreed to above noted.

## 2024-11-22 NOTE — PROGRESS NOTES
moderately obese  EYES:  PERRL, sclera clear  ENT:  Normocephalic,atraumatic, without obvious abnormality  NECK:  supple, symmetrical, trachea midline  LUNGS: Resp effort easy and unlabored, no crackles or wheezing  CARDIOVASCULAR:  NO JVD, regular rate and rhythm and no murmur noted  ABDOMEN:  , normal bowel sounds, soft, non-distended, non-tender,   MUSCULOSKELETAL: No clubbing or cyanosis, 0+ pitting edema lower extremities  NEUROLOGIC:  Mental Status Exam:  Level of Alertness:   awake  PSYCHIATRIC:   person, place, time  SKIN:  no jaundice    DATA:    CBC: No results for input(s): \"WBC\", \"HGB\", \"HCT\", \"PLT\" in the last 72 hours.  BMP:  No results for input(s): \"NA\", \"K\", \"CL\", \"CO2\", \"BUN\", \"CREATININE\", \"GLUCOSE\" in the last 72 hours.  Hepatic: No results for input(s): \"AST\", \"ALT\", \"BILITOT\", \"ALKPHOS\" in the last 72 hours.    Invalid input(s): \"ALB\"  Mag:    No results for input(s): \"MG\" in the last 72 hours.   Phos:   No results for input(s): \"PHOS\" in the last 72 hours.   INR: No results for input(s): \"INR\" in the last 72 hours.    Radiology Review: Images personally reviewed by me.   US - cholelithiasis, no inflammation      IMPRESSION/RECOMMENDATIONS:    25 yo with symptomatic cholelithiasis  Discussed their diagnosis and indications for operation.  Risks of operation and typical recovery reviewed.  Plan for OR soon.    Huber Harden MD      Electronically signed by Angus Harden MD     [facility]  [id number]

## 2024-12-06 NOTE — PROGRESS NOTES
Lan Soliman    Age 24 y.o.    female    2000    MRN [id number]    12/13/2024  Arrival Time_____________  OR Time____________117 Min     Procedure(s):  ROBOTIC CHOLECYSTECTOMY WITH POSSIBLE CHOLANGIOGRAM                      General   Surgeon(s):  Angus Harden, MD      DAY ADMIT ___  SDS/OP ___  OUTPT IN BED ___        Phone [phone] (home)                  PCP _____________________ Phone_________________ Epic ( ) Epic CE ( ) Appt ________    NOTES: _________________________________________ Consult/Cardio _______________    ____________________________________________________________________________    ____________________________________________________________________________  PAT APPT DATE:________ TIME: ________  FAXED QAD: _______  (__) H&P w/ Hospitalist    (__) PAT orders in EPIC    (__) Meet with PAT nurse  __________________________________________________________________________  Preop Nurse phone screen complete: _____________  (__) CBC     (__) W/ DIFF ___________  (__) CT CHEST  __________   (__) Hgb A1C    ___________  (__) CHEST X RAY   __________  (__) LIPID PROFILE  ___________  (__) EKG   __________  (__) PT-INR / APTT  ___________  (__) PFT's   __________  (__) BMP   ___________  (__) CAROTIDS  __________  (__) CMP   ___________  (__) VEIN MAPPING  __________  (__) U/A   ___________  ( X ) HISTORY & PHYSICAL __________  (__) URINE C & S  ___________  (__) CARDIAC CLEARANCE __________  (__) U/A W/ FLEX  ___________  (__) PULM. CLEARANCE __________  (__) SERUM PREGNANCY ___________  (__) Preop Orders in EPIC __________  (__) TYPE & SCREEN __________repeat ( ) (__)  __________________ __________  (__) Albumin   ___________  (__)  __________________ __________  (__) TRANSFERRIN  ___________  (__)  __________________ __________  (__) LIVER PROFILE  ___________  (__) URINE PREG DOS __________  (__) MRSA NASAL SWAB ___________  (__) BLOOD SUGAR DOS __________  (__) SED

## 2024-12-10 NOTE — PROGRESS NOTES
Surgery Date and Time:   12/13/24 @ 11:30 am    Arrival Time:    9:30 am        The instructions given when and if a patient needs to stop oral intake prior to surgery varies. Follow the instructions you were      given by your Surgeon or RN during the Pre-op call.      __X__Do not eat or drink anything after Midnight the night before the surgery. NO gum, mints, candy or ice chips the day of surgery.        Only take the following medications with a small sip of water the morning of surgery:     NONE        Aspirin, Ibuprofen, Advil, Naproxen, Vitamin E and other Anti-inflammatory products and supplements should be stopped       for 5 -7days before surgery or as directed by your physician.     - Do not smoke or vape, and do not drink any alcoholic beverages 24 hours prior to surgery, this includes NA Beer. Refrain from using     any recreational drugs, including non-prescribed prescription drugs.     -You may brush your teeth and gargle the morning of surgery.  DO NOT SWALLOW WATER.    -You MUST plan for a responsible adult to stay on site while you are here and take you home after your surgery. You will not be allowed                to leave alone or drive yourself home. It is requested someone stay with you the first 24 hrs. Your surgery will be cancelled if you do not                have a ride home with a responsible adult.    -Please wear simple, loose-fitting clothing to the hospital. Do not bring valuables (money, credit cards, checkbooks, etc.)                Do not wear any makeup (including no eye makeup) and no nail polish if applicable.             - DO NOT wear any jewelry or body piercings day of surgery.  All body piercing jewelry must be removed.             - If you have dentures they will be removed before going to the OR; we will provide a container.  If you wear contact lenses                or glasses they will be removed, bring a case for them or wear glasses day of surgery.

## 2024-12-13 ENCOUNTER — HOSPITAL ENCOUNTER (OUTPATIENT)
Age: 24
Setting detail: OUTPATIENT SURGERY
Discharge: HOME | End: 2024-12-13
Attending: SURGERY | Admitting: SURGERY
Payer: COMMERCIAL

## 2024-12-13 ENCOUNTER — APPOINTMENT (OUTPATIENT)
Dept: GENERAL RADIOLOGY | Age: 24
End: 2024-12-13
Attending: SURGERY
Payer: COMMERCIAL

## 2024-12-13 ENCOUNTER — ANESTHESIA (OUTPATIENT)
Dept: OPERATING ROOM | Age: 24
End: 2024-12-13
Payer: COMMERCIAL

## 2024-12-13 ENCOUNTER — ANESTHESIA EVENT (OUTPATIENT)
Dept: OPERATING ROOM | Age: 24
End: 2024-12-13
Payer: COMMERCIAL

## 2024-12-13 VITALS
OXYGEN SATURATION: 79 % | TEMPERATURE: 97.9 F | BODY MASS INDEX: 38.04 KG/M2 | HEIGHT: 68 IN | SYSTOLIC BLOOD PRESSURE: 125 MMHG | HEART RATE: 87 BPM | WEIGHT: 251 LBS | RESPIRATION RATE: 21 BRPM | DIASTOLIC BLOOD PRESSURE: 91 MMHG

## 2024-12-13 DIAGNOSIS — K80.20 SYMPTOMATIC CHOLELITHIASIS: ICD-10-CM

## 2024-12-13 DIAGNOSIS — G89.18 POST-OP PAIN: Primary | ICD-10-CM

## 2024-12-13 LAB — HCG UR QL: NEGATIVE

## 2024-12-13 PROCEDURE — 6360000002 HC RX W HCPCS: Performed by: NURSE ANESTHETIST, CERTIFIED REGISTERED

## 2024-12-13 PROCEDURE — 3600000019 HC SURGERY ROBOT ADDTL 15MIN: Performed by: SURGERY

## 2024-12-13 PROCEDURE — 2500000003 HC RX 250 WO HCPCS: Performed by: NURSE ANESTHETIST, CERTIFIED REGISTERED

## 2024-12-13 PROCEDURE — 6360000002 HC RX W HCPCS: Performed by: ANESTHESIOLOGY

## 2024-12-13 PROCEDURE — 3700000001 HC ADD 15 MINUTES (ANESTHESIA): Performed by: SURGERY

## 2024-12-13 PROCEDURE — S2900 ROBOTIC SURGICAL SYSTEM: HCPCS | Performed by: SURGERY

## 2024-12-13 PROCEDURE — 2580000003 HC RX 258: Performed by: SURGERY

## 2024-12-13 PROCEDURE — 7100000011 HC PHASE II RECOVERY - ADDTL 15 MIN: Performed by: SURGERY

## 2024-12-13 PROCEDURE — 7100000000 HC PACU RECOVERY - FIRST 15 MIN: Performed by: SURGERY

## 2024-12-13 PROCEDURE — 2580000003 HC RX 258

## 2024-12-13 PROCEDURE — 2500000003 HC RX 250 WO HCPCS

## 2024-12-13 PROCEDURE — 6360000002 HC RX W HCPCS

## 2024-12-13 PROCEDURE — 7100000010 HC PHASE II RECOVERY - FIRST 15 MIN: Performed by: SURGERY

## 2024-12-13 PROCEDURE — 2500000003 HC RX 250 WO HCPCS: Performed by: SURGERY

## 2024-12-13 PROCEDURE — 6360000002 HC RX W HCPCS: Performed by: SURGERY

## 2024-12-13 PROCEDURE — C1889 IMPLANT/INSERT DEVICE, NOC: HCPCS | Performed by: SURGERY

## 2024-12-13 PROCEDURE — 3700000000 HC ANESTHESIA ATTENDED CARE: Performed by: SURGERY

## 2024-12-13 PROCEDURE — 2709999900 HC NON-CHARGEABLE SUPPLY: Performed by: SURGERY

## 2024-12-13 PROCEDURE — 3600000009 HC SURGERY ROBOT BASE: Performed by: SURGERY

## 2024-12-13 PROCEDURE — 7100000001 HC PACU RECOVERY - ADDTL 15 MIN: Performed by: SURGERY

## 2024-12-13 PROCEDURE — 6370000000 HC RX 637 (ALT 250 FOR IP): Performed by: ANESTHESIOLOGY

## 2024-12-13 PROCEDURE — 88304 TISSUE EXAM BY PATHOLOGIST: CPT

## 2024-12-13 PROCEDURE — 47562 LAPAROSCOPIC CHOLECYSTECTOMY: CPT | Performed by: SURGERY

## 2024-12-13 PROCEDURE — 84703 CHORIONIC GONADOTROPIN ASSAY: CPT

## 2024-12-13 DEVICE — CLIP INT L POLYMER LOK LIG HEM O LOK (6EA/PK): Type: IMPLANTABLE DEVICE | Site: BILE DUCT | Status: FUNCTIONAL

## 2024-12-13 RX ORDER — NALOXONE HYDROCHLORIDE 0.4 MG/ML
INJECTION, SOLUTION INTRAMUSCULAR; INTRAVENOUS; SUBCUTANEOUS PRN
Status: DISCONTINUED | OUTPATIENT
Start: 2024-12-13 | End: 2024-12-13 | Stop reason: HOSPADM

## 2024-12-13 RX ORDER — DOCUSATE SODIUM 100 MG/1
100 CAPSULE, LIQUID FILLED ORAL 2 TIMES DAILY PRN
Qty: 28 CAPSULE | Refills: 0 | Status: SHIPPED | OUTPATIENT
Start: 2024-12-13 | End: 2024-12-27

## 2024-12-13 RX ORDER — MEPERIDINE HYDROCHLORIDE 50 MG/ML
12.5 INJECTION INTRAMUSCULAR; INTRAVENOUS; SUBCUTANEOUS EVERY 5 MIN PRN
Status: DISCONTINUED | OUTPATIENT
Start: 2024-12-13 | End: 2024-12-13 | Stop reason: HOSPADM

## 2024-12-13 RX ORDER — SODIUM CHLORIDE 9 MG/ML
INJECTION, SOLUTION INTRAVENOUS PRN
Status: DISCONTINUED | OUTPATIENT
Start: 2024-12-13 | End: 2024-12-13 | Stop reason: HOSPADM

## 2024-12-13 RX ORDER — LORAZEPAM 2 MG/ML
0.5 INJECTION INTRAMUSCULAR
Status: DISCONTINUED | OUTPATIENT
Start: 2024-12-13 | End: 2024-12-13 | Stop reason: HOSPADM

## 2024-12-13 RX ORDER — HYDROMORPHONE HYDROCHLORIDE 2 MG/ML
INJECTION, SOLUTION INTRAMUSCULAR; INTRAVENOUS; SUBCUTANEOUS
Status: DISCONTINUED | OUTPATIENT
Start: 2024-12-13 | End: 2024-12-13 | Stop reason: SDUPTHER

## 2024-12-13 RX ORDER — FENTANYL CITRATE 50 UG/ML
INJECTION, SOLUTION INTRAMUSCULAR; INTRAVENOUS
Status: DISCONTINUED | OUTPATIENT
Start: 2024-12-13 | End: 2024-12-13 | Stop reason: SDUPTHER

## 2024-12-13 RX ORDER — ROCURONIUM BROMIDE 10 MG/ML
INJECTION, SOLUTION INTRAVENOUS
Status: DISCONTINUED | OUTPATIENT
Start: 2024-12-13 | End: 2024-12-13 | Stop reason: SDUPTHER

## 2024-12-13 RX ORDER — SODIUM CHLORIDE 0.9 % (FLUSH) 0.9 %
5-40 SYRINGE (ML) INJECTION PRN
Status: DISCONTINUED | OUTPATIENT
Start: 2024-12-13 | End: 2024-12-13 | Stop reason: HOSPADM

## 2024-12-13 RX ORDER — OXYCODONE HYDROCHLORIDE 5 MG/1
5 TABLET ORAL
Status: COMPLETED | OUTPATIENT
Start: 2024-12-13 | End: 2024-12-13

## 2024-12-13 RX ORDER — PROPOFOL 10 MG/ML
INJECTION, EMULSION INTRAVENOUS
Status: DISCONTINUED | OUTPATIENT
Start: 2024-12-13 | End: 2024-12-13 | Stop reason: SDUPTHER

## 2024-12-13 RX ORDER — PROCHLORPERAZINE EDISYLATE 5 MG/ML
5 INJECTION INTRAMUSCULAR; INTRAVENOUS
Status: DISCONTINUED | OUTPATIENT
Start: 2024-12-13 | End: 2024-12-13 | Stop reason: HOSPADM

## 2024-12-13 RX ORDER — INDOCYANINE GREEN AND WATER 25 MG
5 KIT INJECTION ONCE
Status: COMPLETED | OUTPATIENT
Start: 2024-12-13 | End: 2024-12-13

## 2024-12-13 RX ORDER — LABETALOL HYDROCHLORIDE 5 MG/ML
10 INJECTION, SOLUTION INTRAVENOUS
Status: DISCONTINUED | OUTPATIENT
Start: 2024-12-13 | End: 2024-12-13 | Stop reason: HOSPADM

## 2024-12-13 RX ORDER — DOCUSATE SODIUM 100 MG/1
100 CAPSULE, LIQUID FILLED ORAL 2 TIMES DAILY PRN
Qty: 28 CAPSULE | Refills: 0 | Status: SHIPPED | OUTPATIENT
Start: 2024-12-13 | End: 2024-12-13

## 2024-12-13 RX ORDER — DEXAMETHASONE SODIUM PHOSPHATE 4 MG/ML
INJECTION, SOLUTION INTRA-ARTICULAR; INTRALESIONAL; INTRAMUSCULAR; INTRAVENOUS; SOFT TISSUE
Status: DISCONTINUED | OUTPATIENT
Start: 2024-12-13 | End: 2024-12-13 | Stop reason: SDUPTHER

## 2024-12-13 RX ORDER — SODIUM CHLORIDE 0.9 % (FLUSH) 0.9 %
5-40 SYRINGE (ML) INJECTION EVERY 12 HOURS SCHEDULED
Status: DISCONTINUED | OUTPATIENT
Start: 2024-12-13 | End: 2024-12-13 | Stop reason: HOSPADM

## 2024-12-13 RX ORDER — MIDAZOLAM HYDROCHLORIDE 1 MG/ML
INJECTION, SOLUTION INTRAMUSCULAR; INTRAVENOUS
Status: DISCONTINUED | OUTPATIENT
Start: 2024-12-13 | End: 2024-12-13 | Stop reason: SDUPTHER

## 2024-12-13 RX ORDER — DIPHENHYDRAMINE HYDROCHLORIDE 50 MG/ML
12.5 INJECTION INTRAMUSCULAR; INTRAVENOUS
Status: DISCONTINUED | OUTPATIENT
Start: 2024-12-13 | End: 2024-12-13 | Stop reason: HOSPADM

## 2024-12-13 RX ORDER — SODIUM CHLORIDE 9 MG/ML
INJECTION, SOLUTION INTRAVENOUS
Status: DISCONTINUED | OUTPATIENT
Start: 2024-12-13 | End: 2024-12-13 | Stop reason: SDUPTHER

## 2024-12-13 RX ORDER — OXYCODONE HYDROCHLORIDE 5 MG/1
5 TABLET ORAL EVERY 6 HOURS PRN
Qty: 12 TABLET | Refills: 0 | Status: SHIPPED | OUTPATIENT
Start: 2024-12-13 | End: 2024-12-16

## 2024-12-13 RX ORDER — LIDOCAINE HYDROCHLORIDE 20 MG/ML
INJECTION, SOLUTION INFILTRATION; PERINEURAL
Status: DISCONTINUED | OUTPATIENT
Start: 2024-12-13 | End: 2024-12-13 | Stop reason: SDUPTHER

## 2024-12-13 RX ORDER — ONDANSETRON 2 MG/ML
INJECTION INTRAMUSCULAR; INTRAVENOUS
Status: DISCONTINUED | OUTPATIENT
Start: 2024-12-13 | End: 2024-12-13 | Stop reason: SDUPTHER

## 2024-12-13 RX ORDER — ONDANSETRON 2 MG/ML
4 INJECTION INTRAMUSCULAR; INTRAVENOUS
Status: DISCONTINUED | OUTPATIENT
Start: 2024-12-13 | End: 2024-12-13 | Stop reason: HOSPADM

## 2024-12-13 RX ORDER — LIDOCAINE HYDROCHLORIDE 10 MG/ML
2 INJECTION, SOLUTION INFILTRATION; PERINEURAL
Status: DISCONTINUED | OUTPATIENT
Start: 2024-12-13 | End: 2024-12-13 | Stop reason: HOSPADM

## 2024-12-13 RX ORDER — OXYCODONE HYDROCHLORIDE 5 MG/1
5 TABLET ORAL EVERY 6 HOURS PRN
Qty: 12 TABLET | Refills: 0 | Status: SHIPPED | OUTPATIENT
Start: 2024-12-13 | End: 2024-12-13

## 2024-12-13 RX ORDER — BUPIVACAINE HYDROCHLORIDE AND EPINEPHRINE 5; 5 MG/ML; UG/ML
INJECTION, SOLUTION PERINEURAL PRN
Status: DISCONTINUED | OUTPATIENT
Start: 2024-12-13 | End: 2024-12-13 | Stop reason: ALTCHOICE

## 2024-12-13 RX ADMIN — ROCURONIUM BROMIDE 20 MG: 50 INJECTION, SOLUTION INTRAVENOUS at 12:08

## 2024-12-13 RX ADMIN — SUGAMMADEX 200 MG: 100 INJECTION, SOLUTION INTRAVENOUS at 12:46

## 2024-12-13 RX ADMIN — DEXAMETHASONE SODIUM PHOSPHATE 8 MG: 4 INJECTION, SOLUTION INTRAMUSCULAR; INTRAVENOUS at 11:31

## 2024-12-13 RX ADMIN — SODIUM CHLORIDE: 9 INJECTION, SOLUTION INTRAVENOUS at 11:53

## 2024-12-13 RX ADMIN — SODIUM CHLORIDE: 9 INJECTION, SOLUTION INTRAVENOUS at 11:18

## 2024-12-13 RX ADMIN — DEXMEDETOMIDINE HYDROCHLORIDE 8 MCG: 100 INJECTION, SOLUTION INTRAVENOUS at 11:45

## 2024-12-13 RX ADMIN — DEXMEDETOMIDINE HYDROCHLORIDE 8 MCG: 100 INJECTION, SOLUTION INTRAVENOUS at 11:56

## 2024-12-13 RX ADMIN — FENTANYL CITRATE 100 MCG: 50 INJECTION, SOLUTION INTRAMUSCULAR; INTRAVENOUS at 11:23

## 2024-12-13 RX ADMIN — ONDANSETRON 4 MG: 2 INJECTION INTRAMUSCULAR; INTRAVENOUS at 12:30

## 2024-12-13 RX ADMIN — DEXMEDETOMIDINE HYDROCHLORIDE 4 MCG: 100 INJECTION, SOLUTION INTRAVENOUS at 12:01

## 2024-12-13 RX ADMIN — HYDROMORPHONE HYDROCHLORIDE 0.5 MG: 1 INJECTION, SOLUTION INTRAMUSCULAR; INTRAVENOUS; SUBCUTANEOUS at 14:21

## 2024-12-13 RX ADMIN — HYDROMORPHONE HYDROCHLORIDE 0.5 MG: 2 INJECTION, SOLUTION INTRAMUSCULAR; INTRAVENOUS; SUBCUTANEOUS at 12:52

## 2024-12-13 RX ADMIN — HYDROMORPHONE HYDROCHLORIDE 0.5 MG: 2 INJECTION, SOLUTION INTRAMUSCULAR; INTRAVENOUS; SUBCUTANEOUS at 12:19

## 2024-12-13 RX ADMIN — INDOCYANINE GREEN AND WATER 5 MG: KIT at 10:47

## 2024-12-13 RX ADMIN — PROPOFOL 150 MG: 10 INJECTION, EMULSION INTRAVENOUS at 11:26

## 2024-12-13 RX ADMIN — ROCURONIUM BROMIDE 50 MG: 50 INJECTION, SOLUTION INTRAVENOUS at 11:26

## 2024-12-13 RX ADMIN — DEXMEDETOMIDINE HYDROCHLORIDE 8 MCG: 100 INJECTION, SOLUTION INTRAVENOUS at 11:51

## 2024-12-13 RX ADMIN — CEFAZOLIN 3000 MG: 3 INJECTION, POWDER, FOR SOLUTION INTRAVENOUS at 11:31

## 2024-12-13 RX ADMIN — HYDROMORPHONE HYDROCHLORIDE 0.5 MG: 1 INJECTION, SOLUTION INTRAMUSCULAR; INTRAVENOUS; SUBCUTANEOUS at 13:24

## 2024-12-13 RX ADMIN — MIDAZOLAM 2 MG: 1 INJECTION INTRAMUSCULAR; INTRAVENOUS at 11:18

## 2024-12-13 RX ADMIN — OXYCODONE 5 MG: 5 TABLET ORAL at 13:22

## 2024-12-13 RX ADMIN — LIDOCAINE HYDROCHLORIDE 100 MG: 20 INJECTION, SOLUTION INFILTRATION; PERINEURAL at 11:26

## 2024-12-13 RX ADMIN — HYDROMORPHONE HYDROCHLORIDE 0.5 MG: 2 INJECTION, SOLUTION INTRAMUSCULAR; INTRAVENOUS; SUBCUTANEOUS at 11:50

## 2024-12-13 ASSESSMENT — PAIN SCALES - GENERAL
PAINLEVEL_OUTOF10: 2
PAINLEVEL_OUTOF10: 7
PAINLEVEL_OUTOF10: 2
PAINLEVEL_OUTOF10: 8
PAINLEVEL_OUTOF10: 9

## 2024-12-13 ASSESSMENT — PAIN DESCRIPTION - LOCATION
LOCATION: ABDOMEN

## 2024-12-13 ASSESSMENT — PAIN SCALES - WONG BAKER: WONGBAKER_NUMERICALRESPONSE: NO HURT

## 2024-12-13 NOTE — DISCHARGE INSTRUCTIONS
Diet:   May resume regular diet    Wound Care:   Surgical grade glue was used on your incision, this will aid in the healing of your incision. It will peel off on its own within 10 days. If it does not peel off in 10 days, you may use petroleum jelly to gently remove it. Do not soak or scrub area of incision for 7-10 days.    Activity:   No heavy lifting greater than a milk jug, or 8 lbs until follow up.    Pain management:   For moderate to severe pain please take the Roxicodone that you were prescribed. If you take narcotics, note that they may cause constipation. For constipation take Colace up to two times a day as needed. If stools become loose, you may reduce the amount of colace you are taking or stop taking all together. Take as little narcotic pain medication as you can tolerate. No driving while on narcotics.    For mild to moderate pain you may take over-the-counter Tylenol or Motrin as directed on the packaging.     Return if:   Call/ Return to ED for increased redness, worsening pain, drainage from wound, or fevers greater than 101.5 F.      Follow up with Dr. Harden in 2 week(s). Please call the office to make an appointment.    ANESTHESIA DISCHARGE INSTRUCTIONS    You are under the influence of drugs- do not drink alcohol, drive a car, operate machinery(such as power tools, kitchen appliances, etc), sign legal documents, or make any important decisions for 24 hours (or while on pain medications).   Children should not ride bikes or skate boards or play on gym sets  for 24 hours after surgery.  A responsible adult should be with you for 24 hours.  Rest at home today- increase activity as tolerated.  Progress slowly to a regular diet unless your physician has instructed you otherwise. Drink plenty of water.    CALL YOUR DOCTOR IF YOU:  Have moderate to severe nausea or vomiting AND are unable to hold down fluids or prescribed medications.  Have bright red bloody drainage from your dressing that won't  stop oozing.  Do not get relief with your pain medication    NORMAL (POSSIBLE) SIDE EFFECTS FROM ANESTHESIA:     Confusion, temporary memory loss, delayed reaction times in the first 24 hours  Lightheadedness, dizziness, difficulty focusing, blurred vision  Nausea/vomiting can happen  Shivering, feeling cold, sore throat, cough and muscle aches should stop within 24-48 hours  Trouble urinating - call your surgeon if it has been more than 8 hrs  Bruising or soreness at the IV site - call if it remains red, firm or there is drainage             FEMALES OF CHILDBEARING AGE WHO ARE TAKING BIRTH CONTROL PILLS:  You may have received a medication during your procedure that interferes with the   actions of birth control pills (Bridion or Emend). Use some other kind of birth control in addition to your pills, like a condom, for 1 month after your procedure to prevent unwanted pregnancy.    The following instructions are to be followed if you have a known history or diagnosis of sleep apnea:  For all sleep apnea patients:  ? Sleep on your side or sitting up in a chair whenever possible, especially the first 24 hours after surgery.  ? Use only medicines prescribed by your doctor.    ? Do not drink alcohol.  ? If you have a dental device to assist you while at rest, use it at all times for the first 24 hours.  For patients using CPAP machines:  ? Use your CPAP machine during all periods of sleep as usual.  ? Use your CPAP machine during all periods of daytime rest while on pain medicines.  ** Follow up with your primary care doctor for continued care.    IF YOU DO NOT TAKE ALL OF YOUR NARCOTIC PAIN MEDICATION, please dispose of them responsibly. There are drop off boxes in the Emergency Departments 24/7 at both [facility]. If these locations are not convenient, other options for discarding them can be found at:  [email or web address]    Hospital or office staff may NOT accept any medications to drop off in

## 2024-12-13 NOTE — H&P
I have reviewed the history and physical and examined the patient.  I find no relevant changes.  I have reviewed with the patient and/or family members, during the preoperative office visit the risks, benefits, and alternatives to the procedure.    Angus Harden MD

## 2024-12-13 NOTE — OP NOTE
was used to enter the abdomen. Insufflation was started and the patient tolerated it without issue. Abdomen inspected and no inflammation of gallbladder seen.  Under direct visualization a right lateral 8mm trocar was placed and an addition 12mm trocar was placed in the supraumbilical region. Patient was placed in slight reverse trendelenburg position. The robot was then brought into position and connected to the appropriate trocars. The camera was inserted as well as initial instruments. Robotically, the gallbladder was then grasped and retracted over dome of liver. Also grasped at infundibulum and retracted anterolaterally. Cystic duct was then circumferentially dissected. Endoclips were placed proximally on cystic and two were placed distally. The cystic artery was then circumferentially dissected with two clips being placed proximal and one distal. It was then divided. Using electrocautery the gallbladder was dissected free of the liver bed, placed into an endoretrieval bag. The liver bed was then inspected and made hemostatic with electrocautery. The area was copiously irrigated with all fluid suctioned off. The cystic duct and artery had clips intact without signs of leak or bleeding. The gallbladder was then removed from the abdomen, and sent off as specimen. The trocars were removed and without bleeding at their insertion sites. The abdomen was allowed to desufflate. The periumbilical trocar site had its fascia closed with 0-0 vicryl figure of eight stitch.     All of the trocar sites had the epidermis closed with 3-0 Vicryl in deep dermal fashion.     Surgical grade glue was used for dressings.     All suture, sponge and instrument count correct times two at end of case. Transferred to PACU in stable condition.     Dr. Harden was present for the entire case.     Electronically signed by Elsa De Jesus DO on 12/13/2024 at 12:59 PM      I agree with above and was physically present and scrubbed and  participated during the entirety of the operation.    Huber Harden MD

## 2024-12-13 NOTE — ANESTHESIA POSTPROCEDURE EVALUATION
Department of Anesthesiology  Postprocedure Note    Patient: Lan Soliman  MRN: [id number]  YOB: 2000  Date of evaluation: 12/13/2024    Procedure Summary       Date: 12/13/24 Room / Location: [address]    Anesthesia Start: 1118 Anesthesia Stop: 1307    Procedure: ROBOTIC CHOLECYSTECTOMY WITH POSSIBLE CHOLANGIOGRAM (Abdomen) Diagnosis:       Symptomatic cholelithiasis      (Symptomatic cholelithiasis [K80.20])    Surgeons: Angus Harden MD Responsible Provider: Randy Carlson MD    Anesthesia Type: general ASA Status: 2            Anesthesia Type: No value filed.    Dago Phase I: Dago Score: 7    Dago Phase II:      Anesthesia Post Evaluation    Patient location during evaluation: PACU  Patient participation: complete - patient participated  Level of consciousness: awake and alert  Airway patency: patent  Nausea & Vomiting: no vomiting and no nausea  Cardiovascular status: hemodynamically stable and blood pressure returned to baseline  Respiratory status: acceptable  Hydration status: euvolemic  Comments: ---------------------------               12/13/24                      1306         ---------------------------   BP:          118/78         Pulse:         84           Resp:          22           Temp:   98.5 °F (36.9 °C)   SpO2:          92%         ---------------------------    Pain management: adequate    No notable events documented.

## 2024-12-13 NOTE — ANESTHESIA PRE PROCEDURE
Department of Anesthesiology  Preprocedure Note       Name:  Lan Soliman   Age:  24 y.o.  :  2000                                          MRN:  [id number]         Date:  2024      Surgeon: Surgeon(s):  Angus Harden MD    Procedure: Procedure(s):  ROBOTIC CHOLECYSTECTOMY WITH POSSIBLE CHOLANGIOGRAM    Medications prior to admission:   Prior to Admission medications    Not on File       Current medications:    Current Facility-Administered Medications   Medication Dose Route Frequency Provider Last Rate Last Admin   • lidocaine 1 % injection 2 mL  2 mL IntraDERmal Once PRN Judd Villareal MD           Allergies:  No Known Allergies    Problem List:    Patient Active Problem List   Diagnosis Code   • Hemorrhoids K64.9   • Electronic cigarette use Z78.9   • Symptomatic cholelithiasis K80.20       Past Medical History:        Diagnosis Date   • Headache    • Snores        Past Surgical History:        Procedure Laterality Date   • TOOTH EXTRACTION      no sedation       Social History:    Social History     Tobacco Use   • Smoking status: Every Day     Types: E-Cigarettes   • Smokeless tobacco: Never   Substance Use Topics   • Alcohol use: Not Currently     Comment: More of a social drinker.                                Ready to quit: Not Answered  Counseling given: Not Answered      Vital Signs (Current):   Vitals:    12/10/24 1010   Weight: 113.9 kg (251 lb)   Height: 1.727 m (5' 8\")                                              BP Readings from Last 3 Encounters:   24 128/86   10/28/24 136/83   23 132/87       NPO Status:                                                                                 BMI:   Wt Readings from Last 3 Encounters:   12/10/24 113.9 kg (251 lb)   24 114.1 kg (251 lb 9.6 oz)   10/28/24 117 kg (258 lb)     Body mass index is 38.16 kg/m².    CBC:   Lab Results   Component Value Date/Time    WBC 6.9 2022 02:55 PM    RBC 4.88 2022

## 2024-12-13 NOTE — BRIEF OP NOTE
Brief Postoperative Note      Patient: Lan Soliman  YOB: 2000  MRN: [id number]    Date of Procedure: 12/13/2024    Pre-Op Diagnosis Codes:      * Symptomatic cholelithiasis [K80.20]    Post-Op Diagnosis: Same       Procedure(s):  ROBOTIC CHOLECYSTECTOMY WITH POSSIBLE CHOLANGIOGRAM    Surgeon(s):  Angus Harden MD    Assistant:  Surgical Assistant: Gilson Barroso Shawna    Anesthesia: General    Estimated Blood Loss (mL): Minimal    Complications: None    Specimens:   ID Type Source Tests Collected by Time Destination   A : GALLBLADDER AND CONTENTS Tissue Gallbladder SURGICAL PATHOLOGY Angus Harden MD 12/13/2024 1243        Implants:  Implant Name Type Inv. Item Serial No.  Lot No. LRB No. Used Action   CLIP INT L POLYMER KHANG LIG HEM O KHANG (6EA/PK) - [id number]  CLIP INT L POLYMER KHANG LIG HEM O KHANG (6EA/PK)  [employer]  N/A 1 Implanted         Drains: * No LDAs found *    Findings:  Infection Present At Time Of Surgery (PATOS) (choose all levels that have infection present):  No infection present  Other Findings: Elective cholecystectomy without complication.  Extraction of the gallbladder from supraumbilical incisions site.   Four incisions covered with surgical grade glue.     Electronically signed by Elsa De Jesus DO on 12/13/2024 at 12:59 PM    I agree with above and was physically present and scrubbed and participated during the entirety of the operation.    Huber Harden MD

## 2024-12-13 NOTE — PROGRESS NOTES
Pt brought to PACU. Report obtained from OR RN and anesthesia. Pt placed on monitor and O2 at  5L.  Oral airway still in place, writer at bedside for monitoring

## 2025-05-20 ENCOUNTER — TELEPHONE (OUTPATIENT)
Dept: FAMILY MEDICINE CLINIC | Age: 25
End: 2025-05-20

## 2025-05-21 ENCOUNTER — OFFICE VISIT (OUTPATIENT)
Dept: FAMILY MEDICINE CLINIC | Age: 25
End: 2025-05-21
Payer: COMMERCIAL

## 2025-05-21 VITALS
OXYGEN SATURATION: 98 % | RESPIRATION RATE: 16 BRPM | SYSTOLIC BLOOD PRESSURE: 128 MMHG | TEMPERATURE: 97.7 F | HEART RATE: 80 BPM | DIASTOLIC BLOOD PRESSURE: 86 MMHG | WEIGHT: 244 LBS | BODY MASS INDEX: 37.1 KG/M2

## 2025-05-21 DIAGNOSIS — F17.290 NICOTINE DEPENDENCE DUE TO VAPING TOBACCO PRODUCT: Primary | ICD-10-CM

## 2025-05-21 PROCEDURE — 99213 OFFICE O/P EST LOW 20 MIN: CPT | Performed by: NURSE PRACTITIONER

## 2025-05-21 RX ORDER — NICOTINE 21 MG/24HR
1 PATCH, TRANSDERMAL 24 HOURS TRANSDERMAL DAILY
Qty: 42 PATCH | Refills: 0 | Status: SHIPPED | OUTPATIENT
Start: 2025-05-21 | End: 2025-07-02

## 2025-05-21 SDOH — ECONOMIC STABILITY: FOOD INSECURITY: WITHIN THE PAST 12 MONTHS, YOU WORRIED THAT YOUR FOOD WOULD RUN OUT BEFORE YOU GOT MONEY TO BUY MORE.: NEVER TRUE

## 2025-05-21 SDOH — ECONOMIC STABILITY: FOOD INSECURITY: WITHIN THE PAST 12 MONTHS, THE FOOD YOU BOUGHT JUST DIDN'T LAST AND YOU DIDN'T HAVE MONEY TO GET MORE.: NEVER TRUE

## 2025-05-21 ASSESSMENT — PATIENT HEALTH QUESTIONNAIRE - PHQ9
SUM OF ALL RESPONSES TO PHQ QUESTIONS 1-9: 0
1. LITTLE INTEREST OR PLEASURE IN DOING THINGS: NOT AT ALL
SUM OF ALL RESPONSES TO PHQ QUESTIONS 1-9: 0
SUM OF ALL RESPONSES TO PHQ QUESTIONS 1-9: 0
2. FEELING DOWN, DEPRESSED OR HOPELESS: NOT AT ALL
SUM OF ALL RESPONSES TO PHQ QUESTIONS 1-9: 0

## 2025-05-21 ASSESSMENT — ENCOUNTER SYMPTOMS
SHORTNESS OF BREATH: 0
DIARRHEA: 0
VOMITING: 0
NAUSEA: 0
COUGH: 0

## 2025-05-21 NOTE — PROGRESS NOTES
2025     Lan Soliman (:  2000) is a 24 y.o. female, here for evaluation of the following medical concerns:    Chief Complaint   Patient presents with    Nicotine Dependence     Discuss stop vaping options       HPI:  History of Present Illness  The patient is a 24-year-old female who presents to discuss smoking cessation.    She has been engaging in vaping since the age of 18, utilizing disposable vapes that typically last her between 7 to 8 days. Despite her attempts at self-initiated cessation, she has found it challenging to overcome her addiction. She has never smoked cigarettes or used smokeless tobacco but did experiment with nicotine pouches briefly before discontinuing due to concerns about gum health. She has not previously tried nicotine patches. She managed to abstain from nicotine for a month, gradually reducing her intake from 5% to 3% and eventually to zero. However, she relapsed after sharing vapes with others. She attempted to quit again by using breath mints, gum, and ChapStick as substitutes for the hand-to-mouth motion associated with vaping, but this effort only lasted 3 days. She does not wake up during the night to vape but admits that it is the first thing she does upon waking.    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 She has been vaping for 6 years. She has never smoked cigarettes or used smokeless tobacco but did experiment with pouches briefly before discontinuing due to concerns about gum health.    ROS:  Review of Systems   Constitutional:  Negative for chills, fatigue and fever.   Respiratory:  Negative for cough and shortness of breath.    Cardiovascular:  Negative for chest pain and leg swelling.   Gastrointestinal:  Negative for diarrhea, nausea and vomiting.   Neurological:  Negative for dizziness and headaches.   All other systems reviewed and are negative.       Physical exam:  Physical Exam  Vitals and nursing note reviewed.

## (undated) DEVICE — Device

## (undated) DEVICE — FENESTRATED BIPOLAR FORCEPS: Brand: ENDOWRIST

## (undated) DEVICE — SUTURE VICRYL + SZ 0 L27IN ABSRB VLT L26MM UR-6 5/8 CIR VCP603H

## (undated) DEVICE — TIP COVER ACCESSORY

## (undated) DEVICE — COLUMN DRAPE

## (undated) DEVICE — CATHETER CHOLGM 4.5FR L18IN W/ MTL SUPP TB

## (undated) DEVICE — PUMP SUC IRR TBNG L10FT W/ HNDPC ASSEMB STRYKEFLOW 2

## (undated) DEVICE — BLADELESS OBTURATOR: Brand: WECK VISTA

## (undated) DEVICE — MEDIUM-LARGE CLIP APPLIER: Brand: ENDOWRIST

## (undated) DEVICE — SUTURE VICRYL + SZ 3-0 L18IN ABSRB UD SH 1/2 CIR TAPERCUT NDL VCP864D

## (undated) DEVICE — GLOVE,SURG,SENSICARE SLT,LF,PF,7: Brand: MEDLINE

## (undated) DEVICE — LIQUIBAND RAPID ADHESIVE 36/CS 0.8ML: Brand: MEDLINE

## (undated) DEVICE — MONOPOLAR CURVED SCISSORS: Brand: ENDOWRIST

## (undated) DEVICE — REDUCER: Brand: ENDOWRIST

## (undated) DEVICE — TISSUE RETRIEVAL SYSTEM: Brand: INZII RETRIEVAL SYSTEM

## (undated) DEVICE — AIRSEAL BIFURCATED SMOKE EVAC FILTERED TUBE SET: Brand: AIRSEAL

## (undated) DEVICE — TROCAR: Brand: KII FIOS FIRST ENTRY

## (undated) DEVICE — ARM DRAPE

## (undated) DEVICE — SOLUTION IV 1000ML 0.9% SOD CHL PH 5 INJ USP VIAFLX PLAS

## (undated) DEVICE — MARYLAND BIPOLAR FORCEPS: Brand: ENDOWRIST

## (undated) DEVICE — SOLUTION IRRIG 1000ML STRL H2O USP PLAS POUR BTL

## (undated) DEVICE — C-ARM: Brand: UNBRANDED

## (undated) DEVICE — SEAL

## (undated) DEVICE — STOPCOCK 3-WAY INTRAVENOUS